# Patient Record
Sex: MALE | HISPANIC OR LATINO | Employment: FULL TIME | URBAN - METROPOLITAN AREA
[De-identification: names, ages, dates, MRNs, and addresses within clinical notes are randomized per-mention and may not be internally consistent; named-entity substitution may affect disease eponyms.]

---

## 2019-10-29 ENCOUNTER — APPOINTMENT (OUTPATIENT)
Dept: RADIOLOGY | Facility: MEDICAL CENTER | Age: 39
End: 2019-10-29
Attending: EMERGENCY MEDICINE

## 2019-10-29 ENCOUNTER — HOSPITAL ENCOUNTER (EMERGENCY)
Facility: MEDICAL CENTER | Age: 39
End: 2019-10-30
Attending: EMERGENCY MEDICINE

## 2019-10-29 DIAGNOSIS — R07.9 CHEST PAIN, UNSPECIFIED TYPE: ICD-10-CM

## 2019-10-29 PROCEDURE — 93005 ELECTROCARDIOGRAM TRACING: CPT | Performed by: EMERGENCY MEDICINE

## 2019-10-29 PROCEDURE — A9270 NON-COVERED ITEM OR SERVICE: HCPCS | Performed by: EMERGENCY MEDICINE

## 2019-10-29 PROCEDURE — 85027 COMPLETE CBC AUTOMATED: CPT

## 2019-10-29 PROCEDURE — 700102 HCHG RX REV CODE 250 W/ 637 OVERRIDE(OP): Performed by: EMERGENCY MEDICINE

## 2019-10-29 PROCEDURE — 80053 COMPREHEN METABOLIC PANEL: CPT

## 2019-10-29 PROCEDURE — 85007 BL SMEAR W/DIFF WBC COUNT: CPT

## 2019-10-29 PROCEDURE — 71045 X-RAY EXAM CHEST 1 VIEW: CPT

## 2019-10-29 PROCEDURE — 84484 ASSAY OF TROPONIN QUANT: CPT

## 2019-10-29 PROCEDURE — 93005 ELECTROCARDIOGRAM TRACING: CPT

## 2019-10-29 RX ORDER — ASPIRIN 81 MG/1
324 TABLET, CHEWABLE ORAL ONCE
Status: COMPLETED | OUTPATIENT
Start: 2019-10-30 | End: 2019-10-29

## 2019-10-29 RX ADMIN — ASPIRIN 81 MG 324 MG: 81 TABLET ORAL at 23:45

## 2019-10-30 VITALS
WEIGHT: 194.45 LBS | OXYGEN SATURATION: 99 % | HEART RATE: 88 BPM | RESPIRATION RATE: 18 BRPM | HEIGHT: 66 IN | TEMPERATURE: 98.6 F | SYSTOLIC BLOOD PRESSURE: 128 MMHG | BODY MASS INDEX: 31.25 KG/M2 | DIASTOLIC BLOOD PRESSURE: 72 MMHG

## 2019-10-30 LAB
ALBUMIN SERPL BCP-MCNC: 4.8 G/DL (ref 3.2–4.9)
ALBUMIN/GLOB SERPL: 2.2 G/DL
ALP SERPL-CCNC: 69 U/L (ref 30–99)
ALT SERPL-CCNC: 46 U/L (ref 2–50)
ANION GAP SERPL CALC-SCNC: 9 MMOL/L (ref 0–11.9)
AST SERPL-CCNC: 24 U/L (ref 12–45)
BASOPHILS # BLD AUTO: 0.9 % (ref 0–1.8)
BASOPHILS # BLD: 0.06 K/UL (ref 0–0.12)
BILIRUB SERPL-MCNC: 0.5 MG/DL (ref 0.1–1.5)
BUN SERPL-MCNC: 14 MG/DL (ref 8–22)
CALCIUM SERPL-MCNC: 9.4 MG/DL (ref 8.5–10.5)
CHLORIDE SERPL-SCNC: 106 MMOL/L (ref 96–112)
CO2 SERPL-SCNC: 26 MMOL/L (ref 20–33)
CREAT SERPL-MCNC: 1.11 MG/DL (ref 0.5–1.4)
EKG IMPRESSION: NORMAL
EKG IMPRESSION: NORMAL
EOSINOPHIL # BLD AUTO: 0.58 K/UL (ref 0–0.51)
EOSINOPHIL NFR BLD: 8.6 % (ref 0–6.9)
ERYTHROCYTE [DISTWIDTH] IN BLOOD BY AUTOMATED COUNT: 40.5 FL (ref 35.9–50)
GLOBULIN SER CALC-MCNC: 2.2 G/DL (ref 1.9–3.5)
GLUCOSE SERPL-MCNC: 167 MG/DL (ref 65–99)
HCT VFR BLD AUTO: 44.7 % (ref 42–52)
HGB BLD-MCNC: 15.2 G/DL (ref 14–18)
LYMPHOCYTES # BLD AUTO: 2.28 K/UL (ref 1–4.8)
LYMPHOCYTES NFR BLD: 33.6 % (ref 22–41)
MANUAL DIFF BLD: NORMAL
MCH RBC QN AUTO: 30.5 PG (ref 27–33)
MCHC RBC AUTO-ENTMCNC: 34 G/DL (ref 33.7–35.3)
MCV RBC AUTO: 89.8 FL (ref 81.4–97.8)
MONOCYTES # BLD AUTO: 0.47 K/UL (ref 0–0.85)
MONOCYTES NFR BLD AUTO: 6.9 % (ref 0–13.4)
MORPHOLOGY BLD-IMP: NORMAL
NEUTROPHILS # BLD AUTO: 3.4 K/UL (ref 1.82–7.42)
NEUTROPHILS NFR BLD: 50 % (ref 44–72)
NRBC # BLD AUTO: 0 K/UL
NRBC BLD-RTO: 0 /100 WBC
PLATELET # BLD AUTO: 279 K/UL (ref 164–446)
PLATELET BLD QL SMEAR: NORMAL
PMV BLD AUTO: 10.1 FL (ref 9–12.9)
POTASSIUM SERPL-SCNC: 3.6 MMOL/L (ref 3.6–5.5)
PROT SERPL-MCNC: 7 G/DL (ref 6–8.2)
RBC # BLD AUTO: 4.98 M/UL (ref 4.7–6.1)
RBC BLD AUTO: PRESENT
SODIUM SERPL-SCNC: 141 MMOL/L (ref 135–145)
TROPONIN T SERPL-MCNC: <6 NG/L (ref 6–19)
TROPONIN T SERPL-MCNC: <6 NG/L (ref 6–19)
VARIANT LYMPHS BLD QL SMEAR: NORMAL
WBC # BLD AUTO: 6.8 K/UL (ref 4.8–10.8)

## 2019-10-30 PROCEDURE — 99285 EMERGENCY DEPT VISIT HI MDM: CPT

## 2019-10-30 PROCEDURE — 93005 ELECTROCARDIOGRAM TRACING: CPT | Performed by: EMERGENCY MEDICINE

## 2019-10-30 PROCEDURE — 84484 ASSAY OF TROPONIN QUANT: CPT

## 2019-10-30 NOTE — ED TRIAGE NOTES
"Chief Complaint   Patient presents with   • Chest Pain     L sided. got worse tonight and his entire L side hurts    • Difficulty Breathing     Pt ambulatory to triage for above complaint. Pt states he has a lot of pressure on his chest. Pt states his entire L side hurts and he feels very warm. Pt appears uncomfortable. Pt denies major medical hx. Pt denies vomiting or nausea.   Pt to EKG.   BP (!) 176/106   Pulse (!) 106   Temp 37 °C (98.6 °F) (Temporal)   Resp 18   Ht 1.676 m (5' 6\")   Wt 88.2 kg (194 lb 7.1 oz)   SpO2 97%   BMI 31.38 kg/m²     "

## 2019-10-30 NOTE — ED NOTES
Pt presents to the ED with complaints of chest pain. Pt states that after she got home from work he got into the shower had started to feel lightheaded and dizzy with chest pain that started mid-chest and radiates t his back. Pt states that he did not pass out or have episode of emesis. Pt states that he still feels the chest pain now with some numbness and tingling in his lower extremities. Pt reports chills but denies fever. Pt denies any medical hx or daily medications. Pt states he has not yet received his flu shot.

## 2019-10-30 NOTE — ED PROVIDER NOTES
"ED Provider Note    Scribed for Shar Trejo M.D. by Windy Lundy. 10/29/2019, 11:32 PM.    Primary care provider: Pcp Pt States None  Means of arrival: Walk in  History obtained from: Patient through    History limited by: None    CHIEF COMPLAINT  Chief Complaint   Patient presents with   • Chest Pain     L sided. got worse tonight and his entire L side hurts    • Difficulty Breathing       HPI  Rex Hidalgo is a 39 y.o. 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 He states the pain resolved shortly after, but was prompted to come to the ED tonight as the pain returned while he was at rest in bed. The patient endorses associated dizziness and tingling to his bilateral lower extremities, but no fever. He has no attempted to take any medicaitons for alleviation. He reports no similar symptoms in the past. The patient has no long term medical conditions and does not take any daily medications.     REVIEW OF SYSTEMS  See HPI for further details. All other systems are negative.     PAST MEDICAL HISTORY   No diabetes.    SURGICAL HISTORY  patient denies any surgical history    SOCIAL HISTORY  Social History     Tobacco Use   • Smoking status: Never Smoker   Substance Use Topics   • Alcohol use: Yes   • Drug use: No      Social History     Substance and Sexual Activity   Drug Use No       FAMILY HISTORY  History reviewed. No pertinent family history.    CURRENT MEDICATIONS  Reviewed.  See Encounter Summary.     ALLERGIES  No Known Allergies    PHYSICAL EXAM  VITAL SIGNS: /88   Pulse 92   Temp 37 °C (98.6 °F) (Temporal)   Resp 18   Ht 1.676 m (5' 6\")   Wt 88.2 kg (194 lb 7.1 oz)   SpO2 95%   BMI 31.38 kg/m²   Constitutional: Alert in no apparent distress.  HENT: No signs of trauma, Bilateral external ears " normal, Nose normal.   Eyes: Pupils are equal and reactive, Conjunctiva normal, Non-icteric.   Neck: Normal range of motion, No tenderness, Supple, No stridor.   Cardiovascular: Regular rate and rhythm, no murmurs.   Thorax & Lungs: Normal breath sounds, No respiratory distress, No wheezing, No chest tenderness.   Abdomen: Bowel sounds normal, Soft, No tenderness, No masses, No pulsatile masses. No peritoneal signs.  Skin: Warm, Dry, No erythema, No rash.   Extremities: Intact distal pulses, No edema, No tenderness, No cyanosis  Musculoskeletal: Good range of motion in all major joints. No tenderness to palpation or major deformities noted.   Neurologic: Alert , Normal motor function, Normal sensory function, No focal deficits noted.   Psychiatric: Affect normal, Judgment normal, Slightly anxious.     DIAGNOSTIC STUDIES / PROCEDURES     LABS  Results for orders placed or performed during the hospital encounter of 10/29/19   CBC with Differential   Result Value Ref Range    WBC 6.8 4.8 - 10.8 K/uL    RBC 4.98 4.70 - 6.10 M/uL    Hemoglobin 15.2 14.0 - 18.0 g/dL    Hematocrit 44.7 42.0 - 52.0 %    MCV 89.8 81.4 - 97.8 fL    MCH 30.5 27.0 - 33.0 pg    MCHC 34.0 33.7 - 35.3 g/dL    RDW 40.5 35.9 - 50.0 fL    Platelet Count 279 164 - 446 K/uL    MPV 10.1 9.0 - 12.9 fL    Neutrophils-Polys 50.00 44.00 - 72.00 %    Lymphocytes 33.60 22.00 - 41.00 %    Monocytes 6.90 0.00 - 13.40 %    Eosinophils 8.60 (H) 0.00 - 6.90 %    Basophils 0.90 0.00 - 1.80 %    Nucleated RBC 0.00 /100 WBC    Neutrophils (Absolute) 3.40 1.82 - 7.42 K/uL    Lymphs (Absolute) 2.28 1.00 - 4.80 K/uL    Monos (Absolute) 0.47 0.00 - 0.85 K/uL    Eos (Absolute) 0.58 (H) 0.00 - 0.51 K/uL    Baso (Absolute) 0.06 0.00 - 0.12 K/uL    NRBC (Absolute) 0.00 K/uL   Complete Metabolic Panel (CMP)   Result Value Ref Range    Sodium 141 135 - 145 mmol/L    Potassium 3.6 3.6 - 5.5 mmol/L    Chloride 106 96 - 112 mmol/L    Co2 26 20 - 33 mmol/L    Anion Gap 9.0 0.0 -  11.9    Glucose 167 (H) 65 - 99 mg/dL    Bun 14 8 - 22 mg/dL    Creatinine 1.11 0.50 - 1.40 mg/dL    Calcium 9.4 8.5 - 10.5 mg/dL    AST(SGOT) 24 12 - 45 U/L    ALT(SGPT) 46 2 - 50 U/L    Alkaline Phosphatase 69 30 - 99 U/L    Total Bilirubin 0.5 0.1 - 1.5 mg/dL    Albumin 4.8 3.2 - 4.9 g/dL    Total Protein 7.0 6.0 - 8.2 g/dL    Globulin 2.2 1.9 - 3.5 g/dL    A-G Ratio 2.2 g/dL   Troponin   Result Value Ref Range    Troponin T <6 6 - 19 ng/L   ESTIMATED GFR   Result Value Ref Range    GFR If African American >60 >60 mL/min/1.73 m 2    GFR If Non African American >60 >60 mL/min/1.73 m 2   DIFFERENTIAL MANUAL   Result Value Ref Range    Manual Diff Status PERFORMED    PERIPHERAL SMEAR REVIEW   Result Value Ref Range    Peripheral Smear Review see below    PLATELET ESTIMATE   Result Value Ref Range    Plt Estimation Normal    MORPHOLOGY   Result Value Ref Range    RBC Morphology Present     Reactive Lymphocytes Few    TROPONIN   Result Value Ref Range    Troponin T <6 6 - 19 ng/L   EKG   Result Value Ref Range    Report       [facility] Emergency Dept.    Test Date:  2019-10-29  Pt Name:    ISIDRO BROWN                 Department: ER  MRN:        [id number]                      Room:  Gender:     Male                         Technician: [id number]  :        1980                   Requested By:ER TRIAGE PROTOCOL  Order #:    [id number]                    Reading MD: Shar Trejo    Measurements  Intervals                                Axis  Rate:       96                           P:          70  WA:         144                          QRS:        36  QRSD:       92                           T:          3  QT:         348  QTc:        440    Interpretive Statements  SINUS RHYTHM  No previous ECG available for comparison  Electronically Signed On 10- 2:28:59 PDT by Shar Trejo     EKG   Result Value Ref Range    Report       [facility] Emergency Dept.    Test Date:   2019-10-30  Pt Name:    ISIDRO BROWN                 Department: ER  MRN:        [id number]                      Room:       RD 10  Gender:     Male                         Technician: [id number]  :        1980                   Requested By:SANTOSH BLOOD  Order #:    [id number]                    Reading MD:    Measurements  Intervals                                Axis  Rate:       76                           P:          47  WY:         152                          QRS:        15  QRSD:       94                           T:          -1  QT:         376  QTc:        423    Interpretive Statements  SINUS RHYTHM  Compared to ECG 10/29/2019 23:02:48  No significant changes       All labs were reviewed by me.    EKG  12 Lead EKG interpreted by me to show:  Sinus rhythm  Rate 96  Axis: Normal  Isolated T wave inversion in lead III  Isolated J point elevation in V2, likely benign early repol  No old EKG available for comparison.     Repeat EKG (02:35)   12 Lead EKG interpreted by me to show:  Sinus rhythm  Rate 76  Axis: Normal  Isolated T wave inversion in lead III  Isolated J point elevation in V2, likely benign early repol  Unchanged from EKG done earlier today.     RADIOLOGY  DX-CHEST-PORTABLE (1 VIEW)   Final Result      No radiographic evidence of acute cardiopulmonary process.        The radiologist's interpretation of all radiological studies and images have been reviewed by me.    COURSE & MEDICAL DECISION MAKING  Pertinent Labs & Imaging studies reviewed. (See chart for details)    11:32 PM - Patient seen and examined at bedside. Patient will be treated with Aspirin 324 mg. Ordered DX-Chest (1 view), CBC with differential, CMP, Troponin, and EKG to evaluate his symptoms.     2:30 AM - Patient was reevaluated at bedside. He is resting in bed. Discussed lab and radiology results with the patient which are reassuring, indicating normal troponin levels. He was informed he will be discharged home and will need to  follow up with Dr. Martinez (Cardiology).  The patient is understanding and agreeable to discharge.     Decision Making:  This is a 39 y.o. year old male who presents with above complaint.  Low risk from a heart perspective.  Delta troponin is negative.  No acute abnormalities noted on today's evaluation otherwise suggest the etiology of the patient's current complaint.  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  At this point he is feeling better.  He will be discharged to home with outpatient follow-up with cardiology for reevaluation.    The patient will return for new or worsening symptoms and is stable at the time of discharge.    DISPOSITION:  Patient will be discharged home in stable condition.    FOLLOW UP:  Emily Martinez M.D.  [address]  [address]  [facility] [id number]  [phone]          FINAL IMPRESSION  1. Chest pain, unspecified type          I, Windy Lundy (Scotty), am scribing for, and in the presence of, Shar Trejo M.D..    Electronically signed by: Windy Lundy (Scotty), 10/29/2019    IShar M.D. personally performed the services described in this documentation, as scribed by Windy Lundy in my presence, and it is both accurate and complete.    C.    The note accurately reflects work and decisions made by me.  Shar Trejo  10/30/2019  3:45 AM

## 2019-10-30 NOTE — ED NOTES
Pt discharged; Pt verbalized understanding of discharge education as well as information for follow-up. Pt ambulated to the lobby with steady gait accompanied by family for transportation home.

## 2022-02-24 ENCOUNTER — HOSPITAL ENCOUNTER (EMERGENCY)
Facility: MEDICAL CENTER | Age: 42
End: 2022-02-24
Attending: EMERGENCY MEDICINE

## 2022-02-24 ENCOUNTER — APPOINTMENT (OUTPATIENT)
Dept: RADIOLOGY | Facility: MEDICAL CENTER | Age: 42
End: 2022-02-24
Attending: EMERGENCY MEDICINE

## 2022-02-24 VITALS
HEART RATE: 85 BPM | BODY MASS INDEX: 32.6 KG/M2 | HEIGHT: 66 IN | TEMPERATURE: 98.3 F | SYSTOLIC BLOOD PRESSURE: 147 MMHG | WEIGHT: 202.82 LBS | DIASTOLIC BLOOD PRESSURE: 84 MMHG | OXYGEN SATURATION: 99 % | RESPIRATION RATE: 18 BRPM

## 2022-02-24 DIAGNOSIS — R42 DIZZINESS: ICD-10-CM

## 2022-02-24 DIAGNOSIS — R07.9 CHEST PAIN, UNSPECIFIED TYPE: ICD-10-CM

## 2022-02-24 LAB
ALBUMIN SERPL BCP-MCNC: 4.8 G/DL (ref 3.2–4.9)
ALBUMIN/GLOB SERPL: 2 G/DL
ALP SERPL-CCNC: 89 U/L (ref 30–99)
ALT SERPL-CCNC: 43 U/L (ref 2–50)
ANION GAP SERPL CALC-SCNC: 14 MMOL/L (ref 7–16)
AST SERPL-CCNC: 26 U/L (ref 12–45)
BASOPHILS # BLD AUTO: 1.5 % (ref 0–1.8)
BASOPHILS # BLD: 0.12 K/UL (ref 0–0.12)
BILIRUB SERPL-MCNC: 0.6 MG/DL (ref 0.1–1.5)
BUN SERPL-MCNC: 14 MG/DL (ref 8–22)
CALCIUM SERPL-MCNC: 9.5 MG/DL (ref 8.4–10.2)
CHLORIDE SERPL-SCNC: 102 MMOL/L (ref 96–112)
CO2 SERPL-SCNC: 22 MMOL/L (ref 20–33)
CREAT SERPL-MCNC: 1.2 MG/DL (ref 0.5–1.4)
EKG IMPRESSION: NORMAL
EOSINOPHIL # BLD AUTO: 0.49 K/UL (ref 0–0.51)
EOSINOPHIL NFR BLD: 6.1 % (ref 0–6.9)
ERYTHROCYTE [DISTWIDTH] IN BLOOD BY AUTOMATED COUNT: 40.2 FL (ref 35.9–50)
GLOBULIN SER CALC-MCNC: 2.4 G/DL (ref 1.9–3.5)
GLUCOSE SERPL-MCNC: 135 MG/DL (ref 65–99)
HCT VFR BLD AUTO: 48.6 % (ref 42–52)
HGB BLD-MCNC: 16.9 G/DL (ref 14–18)
IMM GRANULOCYTES # BLD AUTO: 0.04 K/UL (ref 0–0.11)
IMM GRANULOCYTES NFR BLD AUTO: 0.5 % (ref 0–0.9)
LYMPHOCYTES # BLD AUTO: 2.76 K/UL (ref 1–4.8)
LYMPHOCYTES NFR BLD: 34.2 % (ref 22–41)
MCH RBC QN AUTO: 30.4 PG (ref 27–33)
MCHC RBC AUTO-ENTMCNC: 34.8 G/DL (ref 33.7–35.3)
MCV RBC AUTO: 87.4 FL (ref 81.4–97.8)
MONOCYTES # BLD AUTO: 0.76 K/UL (ref 0–0.85)
MONOCYTES NFR BLD AUTO: 9.4 % (ref 0–13.4)
NEUTROPHILS # BLD AUTO: 3.91 K/UL (ref 1.82–7.42)
NEUTROPHILS NFR BLD: 48.3 % (ref 44–72)
NRBC # BLD AUTO: 0 K/UL
NRBC BLD-RTO: 0 /100 WBC
PLATELET # BLD AUTO: 263 K/UL (ref 164–446)
PMV BLD AUTO: 9.8 FL (ref 9–12.9)
POTASSIUM SERPL-SCNC: 3.5 MMOL/L (ref 3.6–5.5)
PROT SERPL-MCNC: 7.2 G/DL (ref 6–8.2)
RBC # BLD AUTO: 5.56 M/UL (ref 4.7–6.1)
SODIUM SERPL-SCNC: 138 MMOL/L (ref 135–145)
TROPONIN T SERPL-MCNC: <6 NG/L (ref 6–19)
WBC # BLD AUTO: 8.1 K/UL (ref 4.8–10.8)

## 2022-02-24 PROCEDURE — 93005 ELECTROCARDIOGRAM TRACING: CPT | Performed by: EMERGENCY MEDICINE

## 2022-02-24 PROCEDURE — 80053 COMPREHEN METABOLIC PANEL: CPT

## 2022-02-24 PROCEDURE — 71045 X-RAY EXAM CHEST 1 VIEW: CPT

## 2022-02-24 PROCEDURE — 85025 COMPLETE CBC W/AUTO DIFF WBC: CPT

## 2022-02-24 PROCEDURE — 93005 ELECTROCARDIOGRAM TRACING: CPT

## 2022-02-24 PROCEDURE — 84484 ASSAY OF TROPONIN QUANT: CPT

## 2022-02-24 PROCEDURE — 99283 EMERGENCY DEPT VISIT LOW MDM: CPT

## 2022-02-24 PROCEDURE — 99284 EMERGENCY DEPT VISIT MOD MDM: CPT

## 2022-02-24 ASSESSMENT — ENCOUNTER SYMPTOMS
FEVER: 0
SHORTNESS OF BREATH: 0
VOMITING: 0
PALPITATIONS: 1
NAUSEA: 0
DIZZINESS: 1
CHILLS: 0

## 2022-02-24 ASSESSMENT — PAIN DESCRIPTION - PAIN TYPE: TYPE: ACUTE PAIN

## 2022-02-25 NOTE — ED TRIAGE NOTES
"Chief Complaint   Patient presents with   • Chest Pain     Intermittent Monday, constant since Tuesday. Left sided chest pain, \"like a burn\" pain. No SoB.     ED Triage Vitals [02/24/22 2024]   Enc Vitals Group      Blood Pressure (!) 209/99      Pulse 86      Respiration 16      Temperature 36.8 °C (98.3 °F)      Temp src Temporal      Pulse Oximetry 98 %      Weight 92 kg (202 lb 13.2 oz)      Height 1.676 m (5' 6\")     Left arm BP also checked, 192/93.    "

## 2022-02-25 NOTE — ED NOTES
Pt discharged in stable condition.  Pt instructed to follow up with cardiology, follow up with PCP, return to the ER if symptoms worsen.

## 2022-08-02 ENCOUNTER — HOSPITAL ENCOUNTER (EMERGENCY)
Facility: MEDICAL CENTER | Age: 42
End: 2022-08-02

## 2022-08-02 ENCOUNTER — HOSPITAL ENCOUNTER (EMERGENCY)
Facility: MEDICAL CENTER | Age: 42
End: 2022-08-03
Attending: STUDENT IN AN ORGANIZED HEALTH CARE EDUCATION/TRAINING PROGRAM
Payer: COMMERCIAL

## 2022-08-02 VITALS
SYSTOLIC BLOOD PRESSURE: 189 MMHG | TEMPERATURE: 99.1 F | DIASTOLIC BLOOD PRESSURE: 107 MMHG | HEART RATE: 95 BPM | OXYGEN SATURATION: 95 % | BODY MASS INDEX: 32.38 KG/M2 | RESPIRATION RATE: 16 BRPM | WEIGHT: 201.5 LBS | HEIGHT: 66 IN

## 2022-08-02 DIAGNOSIS — R20.0 LUE NUMBNESS: ICD-10-CM

## 2022-08-02 DIAGNOSIS — S20.212A CONTUSION OF RIB ON LEFT SIDE, INITIAL ENCOUNTER: ICD-10-CM

## 2022-08-02 PROCEDURE — 96372 THER/PROPH/DIAG INJ SC/IM: CPT

## 2022-08-02 PROCEDURE — 99284 EMERGENCY DEPT VISIT MOD MDM: CPT

## 2022-08-02 PROCEDURE — A9270 NON-COVERED ITEM OR SERVICE: HCPCS | Performed by: STUDENT IN AN ORGANIZED HEALTH CARE EDUCATION/TRAINING PROGRAM

## 2022-08-02 PROCEDURE — 700111 HCHG RX REV CODE 636 W/ 250 OVERRIDE (IP): Performed by: STUDENT IN AN ORGANIZED HEALTH CARE EDUCATION/TRAINING PROGRAM

## 2022-08-02 PROCEDURE — 700101 HCHG RX REV CODE 250: Performed by: STUDENT IN AN ORGANIZED HEALTH CARE EDUCATION/TRAINING PROGRAM

## 2022-08-02 PROCEDURE — 700102 HCHG RX REV CODE 250 W/ 637 OVERRIDE(OP): Performed by: STUDENT IN AN ORGANIZED HEALTH CARE EDUCATION/TRAINING PROGRAM

## 2022-08-02 RX ORDER — METHOCARBAMOL 500 MG/1
1000 TABLET, FILM COATED ORAL ONCE
Status: COMPLETED | OUTPATIENT
Start: 2022-08-02 | End: 2022-08-02

## 2022-08-02 RX ORDER — LIDOCAINE 50 MG/G
1 PATCH TOPICAL ONCE
Status: DISCONTINUED | OUTPATIENT
Start: 2022-08-02 | End: 2022-08-03 | Stop reason: HOSPADM

## 2022-08-02 RX ORDER — KETOROLAC TROMETHAMINE 30 MG/ML
15 INJECTION, SOLUTION INTRAMUSCULAR; INTRAVENOUS ONCE
Status: COMPLETED | OUTPATIENT
Start: 2022-08-02 | End: 2022-08-02

## 2022-08-02 RX ADMIN — METHOCARBAMOL 1000 MG: 500 TABLET ORAL at 23:36

## 2022-08-02 RX ADMIN — LIDOCAINE 1 PATCH: 50 PATCH TOPICAL at 23:36

## 2022-08-02 RX ADMIN — KETOROLAC TROMETHAMINE 15 MG: 30 INJECTION, SOLUTION INTRAMUSCULAR; INTRAVENOUS at 23:36

## 2022-08-02 ASSESSMENT — ENCOUNTER SYMPTOMS
FEVER: 0
CHILLS: 0
NAUSEA: 1
SHORTNESS OF BREATH: 1
NECK PAIN: 0
COUGH: 1
LOSS OF CONSCIOUSNESS: 0
HEADACHES: 0
FALLS: 0
SORE THROAT: 0
DOUBLE VISION: 0
VOMITING: 0
ABDOMINAL PAIN: 1
BLURRED VISION: 0

## 2022-08-02 ASSESSMENT — FIBROSIS 4 INDEX: FIB4 SCORE: 0.6

## 2022-08-02 NOTE — LETTER
"  FORM C-4:  EMPLOYEE’S CLAIM FOR COMPENSATION/ REPORT OF INITIAL TREATMENT  EMPLOYEE’S CLAIM - PROVIDE ALL INFORMATION REQUESTED   First Name Jerson Last Name Rocky Birthdate 1980  Sex male Claim Number   Home Address [address]   [facility] [place] [place]             Zip [address]                                   Age  42 y.o. Height  1.676 m (5' 6\") Weight  91.4 kg (201 lb 8 oz) [facility]  [id number]  [id number]   Mailing Address [address]  [place]              [place] [address] Telephone  [phone] (home)  Primary Language Spoken  Montserratian   Insurer   Third Party   creads Employee's Occupation (Job Title) When Injury or Occupational Disease Occurred  [employer]   Employer's Name [employer] Telephone [phone]    Employer Address [address] [facility] [address]] Zip [address]   Date of Injury  8/1/2022       Hour of Injury  3:00 PM Date Employer Notified  8/2/2022 Last Day of Work after Injury or Occupational Disease  8/2/2022 Supervisor to Whom Injury Reported  Tahir NEYMAR Hidalgo   Address or Location of Accident (if applicable) Work [1]   What were you doing at the time of accident? (if applicable) [employer]    How did this injury or occupational disease occur? Be specific and answer in detail. Use additional sheet if necessary)  en el proceso de formar la varilla se le dejo caer enlado izaquerdo de las costillos   If you believe that you have an occupational disease, when did you first have knowledge of the disability and it relationship to your employment? N/A Witnesses to the Accident  N/A   Nature of Injury or Occupational Disease  Workers' Compensation Part(s) of Body Injured or Affected  Internal Organs, N/A, N/A    I CERTIFY THAT THE ABOVE IS TRUE AND CORRECT TO THE BEST OF MY KNOWLEDGE AND THAT I HAVE PROVIDED THIS INFORMATION IN ORDER TO OBTAIN THE BENEFITS OF [place] AND " [place]  I HEREBY AUTHORIZE ANY PHYSICIAN, CHIROPRACTOR, SURGEON, PRACTITIONER, OR OTHER PERSON, ANY HOSPITAL, INCLUDING [facility] OR [facility], ANY MEDICAL SERVICE ORGANIZATION, ANY INSURANCE COMPANY, OR OTHER INSTITUTION OR ORGANIZATION TO RELEASE TO EACH OTHER, ANY MEDICAL OR OTHER INFORMATION, INCLUDING BENEFITS PAID OR PAYABLE, PERTINENT TO THIS INJURY OR DISEASE, EXCEPT INFORMATION RELATIVE TO DIAGNOSIS, TREATMENT AND/OR COUNSELING FOR AIDS, PSYCHOLOGICAL CONDITIONS, ALCOHOL OR CONTROLLED SUBSTANCES, FOR WHICH I MUST GIVE SPECIFIC AUTHORIZATION.  A PHOTOSTAT OF THIS AUTHORIZATION SHALL BE AS VALID AS THE ORIGINAL.  Date  8/3/2022      Place  [facility]       Employee’s Signature   THIS REPORT MUST BE COMPLETED AND MAILED WITHIN 3 WORKING DAYS OF TREATMENT   Place [facility], EMERGENCY DEPT                       Name of Facility [facility]   Date  8/2/2022 Diagnosis  (S20.212A) Contusion of rib on left side, initial encounter  (R20.0) LUE numbness Is there evidence the injured employee was under the influence of alcohol and/or another controlled substance at the time of accident?   Hour  4:06 AM Description of Injury or Disease  Contusion of rib on left side, initial encounter  LUE numbness No   Treatment     Have you advised the patient to remain off work five days or more?         No   X-Ray Findings  Negative If Yes   From Date    To Date      From information given by the employee, together with medical evidence, can you directly connect this injury or occupational disease as job incurred? Yes If No, is employee capable of: Full Duty  No Modified Duty  Yes   Is additional medical care by a physician indicated? Yes If Modified Duty, Specify any Limitations / Restrictions       Do you know of any previous injury or disease contributing to this condition or occupational  "disease? No    Date 8/3/2022 Print Doctor’s Name Musa, Radha ROMERO certify the employer’s copy of this form was mailed on:   Address [address] [id number] [phone] INSURER’S USE ONLY   Provider’s Tax ID Number [id number] Telephone Dept: [phone]    Doctor’s Signature e-RADHA Sinclair M.D. Degree     MD      Form C-4 (rev.10/07)                                                                         BRIEF DESCRIPTION OF RIGHTS AND BENEFITS  (Pursuant to [place] 616[place])    Notice of Injury or Occupational Disease (Incident Report Form C-1): If an injury or occupational disease (OD) arises out of and in the course of employment, you must provide written notice to your employer as soon as practicable, but no later than 7 days after the accident or OD. Your employer shall maintain a sufficient supply of the required forms.    Claim for Compensation (Form C-4): If medical treatment is sought, the form C-4 is available at the place of initial treatment. A completed \"Claim for Compensation\" (Form C-4) must be filed within 90 days after an accident or OD. The treating physician or chiropractor must, within 3 working days after treatment, complete and mail to the employer, the employer's insurer and third-party , the Claim for Compensation.    Medical Treatment: If you require medical treatment for your on-the-job injury or OD, you may be required to select a physician or chiropractor from a list provided by your workers’ compensation insurer, if it has contracted with an Organization for Managed Care (MCO) or Preferred Provider Organization (PPO) or providers of health care. If your employer has not entered into a contract with an MCO or PPO, you may select a physician or chiropractor from the Panel of Physicians and Chiropractors. Any medical costs related to your industrial injury or OD will be paid by your insurer.    Temporary Total Disability (TTD): If your doctor " has certified that you are unable to work for a period of at least 5 consecutive days, or 5 cumulative days in a 20-day period, or places restrictions on you that your employer does not accommodate, you may be entitled to TTD compensation.    Temporary Partial Disability (TPD): If the wage you receive upon reemployment is less than the compensation for TTD to which you are entitled, the insurer may be required to pay you TPD compensation to make up the difference. TPD can only be paid for a maximum of 24 months.    Permanent Partial Disability (PPD): When your medical condition is stable and there is an indication of a PPD as a result of your injury or OD, within 30 days, your insurer must arrange for an evaluation by a rating physician or chiropractor to determine the degree of your PPD. The amount of your PPD award depends on the date of injury, the results of the PPD evaluation, your age and wage.    Permanent Total Disability (PTD): If you are medically certified by a treating physician or chiropractor as permanently and totally disabled and have been granted a PTD status by your insurer, you are entitled to receive monthly benefits not to exceed 66 2/3% of your average monthly wage. The amount of your PTD payments is subject to reduction if you previously received a lump-sum PPD award.    Vocational Rehabilitation Services: You may be eligible for vocational rehabilitation services if you are unable to return to the job due to a permanent physical impairment or permanent restrictions as a result of your injury or occupational disease.    Transportation and Per Richa Reimbursement: You may be eligible for travel expenses and per richa associated with medical treatment.    Reopening: You may be able to reopen your claim if your condition worsens after claim closure.     Appeal Process: If you disagree with a written determination issued by the insurer or the insurer does not respond to your request, you may appeal  to the Department of Administration, , by following the instructions contained in your determination letter. You must appeal the determination within 70 days from the date of the determination letter at [address], [address], [place], [place] [address], or [address], [address], [place], [place] [address]. If you disagree with the  decision, you may appeal to the Department of Administration, . You must file your appeal within 30 days from the date of the  decision letter at [address], [address], [place], [place] [address], or [address], [address], [place], [place] [address]. If you disagree with a decision of an , you may file a petition for judicial review with the District Court. You must do so within 30 days of the Appeal Officer’s decision. You may be represented by an  at your own expense or you may contact the [facility] for possible representation.    [place]  for Injured Workers (NAIW): If you disagree with a  decision, you may request that NAIW represent you without charge at an  Hearing. For information regarding denial of benefits, you may contact the [facility] at: [address], [address], [place] [address], [phone], or [address], [address], [place] [address], [phone]    To File a Complaint with the Division: If you wish to file a complaint with the  of the Division of Industrial Relations (DIR),  please contact the Workers’ Compensation Section, [address], [address], [place], [place] [address], telephone ([phone], or [address], [address], [place], [place] [address], telephone [phone].    For assistance with Workers’ Compensation Issues: You may contact the [facility] Office for Consumer Health Assistance, [address] [place], [address], [place], [place] [address],  Toll Free 1-999.800.8905, Web site: http://[facility].[place].gov/Programs/JUAN JOSÉ E-mail: juan josé@[facility].[place].gov  D-2 (rev. 10/20)              __________________________________________________________________                                    _________________            Employee Name / Signature                                                                                                                            Date

## 2022-08-02 NOTE — LETTER
"  FORM C-4:  EMPLOYEE’S CLAIM FOR COMPENSATION/ REPORT OF INITIAL TREATMENT  EMPLOYEE’S CLAIM - PROVIDE ALL INFORMATION REQUESTED   First Name Jerson Last Name Rocky Birthdate 1980  Sex male Claim Number   Home Address [address]  [address]   [place]             [place] [address]                                   Age  42 y.o. Height  1.676 m (5' 6\") Weight  91.4 kg (201 lb 8 oz) [facility]  [id number]   Mailing Address [address]  [place]              [place] [address] Telephone  [phone] (home)  Primary Language Spoken   Insurer  *** Third Party   [employer] Employee's Occupation (Job Title) When Injury or Occupational Disease Occurred  [employer]   Employer's Name [employer] Telephone [phone]    Employer Address [address] [facility] [[address]] Zip [address]   Date of Injury  8/1/2022       Hour of Injury  3:00 PM Date Employer Notified  8/2/2022 Last Day of Work after Injury or Occupational Disease  8/2/2022 Supervisor to Whom Injury Reported  Tahir NEYMAR Hidalgo   Address or Location of Accident (if applicable) Work [1]   What were you doing at the time of accident? (if applicable) [employer]    How did this injury or occupational disease occur? Be specific and answer in detail. Use additional sheet if necessary)  en el proceso de formar la varilla se le dejo caer enlado izaquerdo de las costillos   If you believe that you have an occupational disease, when did you first have knowledge of the disability and it relationship to your employment? N/A Witnesses to the Accident  N/A   Nature of Injury or Occupational Disease  Workers' Compensation Part(s) of Body Injured or Affected  Internal Organs, N/A, N/A    I CERTIFY THAT THE ABOVE IS TRUE AND CORRECT TO THE BEST OF MY KNOWLEDGE AND THAT I HAVE PROVIDED THIS INFORMATION IN ORDER TO OBTAIN THE BENEFITS OF [place] AND " [place] CHAPTER 617 OF [place]).  I HEREBY AUTHORIZE ANY PHYSICIAN, CHIROPRACTOR, SURGEON, PRACTITIONER, OR OTHER PERSON, ANY HOSPITAL, INCLUDING [facility] OR [facility], ANY MEDICAL SERVICE ORGANIZATION, ANY INSURANCE COMPANY, OR OTHER INSTITUTION OR ORGANIZATION TO RELEASE TO EACH OTHER, ANY MEDICAL OR OTHER INFORMATION, INCLUDING BENEFITS PAID OR PAYABLE, PERTINENT TO THIS INJURY OR DISEASE, EXCEPT INFORMATION RELATIVE TO DIAGNOSIS, TREATMENT AND/OR COUNSELING FOR AIDS, PSYCHOLOGICAL CONDITIONS, ALCOHOL OR CONTROLLED SUBSTANCES, FOR WHICH I MUST GIVE SPECIFIC AUTHORIZATION.  A PHOTOSTAT OF THIS AUTHORIZATION SHALL BE AS VALID AS THE ORIGINAL.  Date                                      Place                                                                             Employee’s Signature   THIS REPORT MUST BE COMPLETED AND MAILED WITHIN 3 WORKING DAYS OF TREATMENT   Place [facility], EMERGENCY DEPT                       Name of Facility [facility]   Date  8/2/2022 Diagnosis  (S20.212A) Contusion of rib on left side, initial encounter  (R20.0) LUE numbness Is there evidence the injured employee was under the influence of alcohol and/or another controlled substance at the time of accident?   Hour  3:30 AM Description of Injury or Disease  Contusion of rib on left side, initial encounter  LUE numbness     Treatment     Have you advised the patient to remain off work five days or more?             X-Ray Findings    If Yes   From Date    To Date      From information given by the employee, together with medical evidence, can you directly connect this injury or occupational disease as job incurred?   If No, is employee capable of: Full Duty    Modified Duty      Is additional medical care by a physician indicated?   If Modified Duty, Specify any Limitations / Restrictions       Do you know of any  "previous injury or disease contributing to this condition or occupational disease?      Date 8/3/2022 Print Doctor’s Name Christiano Musa certify the employer’s copy of this form was mailed on:   Address [address] [id number] [phone] INSURER’S USE ONLY   Provider’s Tax ID Number [id number] Telephone Dept: [phone]    Doctor’s Signature   Degree        Form C-4 (rev.10/07)                                                                         BRIEF DESCRIPTION OF RIGHTS AND BENEFITS  (Pursuant to [place]RS 616C.050)    Notice of Injury or Occupational Disease (Incident Report Form C-1): If an injury or occupational disease (OD) arises out of and in the course of employment, you must provide written notice to your employer as soon as practicable, but no later than 7 days after the accident or OD. Your employer shall maintain a sufficient supply of the required forms.    Claim for Compensation (Form C-4): If medical treatment is sought, the form C-4 is available at the place of initial treatment. A completed \"Claim for Compensation\" (Form C-4) must be filed within 90 days after an accident or OD. The treating physician or chiropractor must, within 3 working days after treatment, complete and mail to the employer, the employer's insurer and third-party , the Claim for Compensation.    Medical Treatment: If you require medical treatment for your on-the-job injury or OD, you may be required to select a physician or chiropractor from a list provided by your workers’ compensation insurer, if it has contracted with an Organization for Managed Care (MCO) or Preferred Provider Organization (PPO) or providers of health care. If your employer has not entered into a contract with an MCO or PPO, you may select a physician or chiropractor from the Panel of Physicians and Chiropractors. Any medical costs related to your industrial injury or OD will be paid by your insurer.    Temporary " Total Disability (TTD): If your doctor has certified that you are unable to work for a period of at least 5 consecutive days, or 5 cumulative days in a 20-day period, or places restrictions on you that your employer does not accommodate, you may be entitled to TTD compensation.    Temporary Partial Disability (TPD): If the wage you receive upon reemployment is less than the compensation for TTD to which you are entitled, the insurer may be required to pay you TPD compensation to make up the difference. TPD can only be paid for a maximum of 24 months.    Permanent Partial Disability (PPD): When your medical condition is stable and there is an indication of a PPD as a result of your injury or OD, within 30 days, your insurer must arrange for an evaluation by a rating physician or chiropractor to determine the degree of your PPD. The amount of your PPD award depends on the date of injury, the results of the PPD evaluation, your age and wage.    Permanent Total Disability (PTD): If you are medically certified by a treating physician or chiropractor as permanently and totally disabled and have been granted a PTD status by your insurer, you are entitled to receive monthly benefits not to exceed 66 2/3% of your average monthly wage. The amount of your PTD payments is subject to reduction if you previously received a lump-sum PPD award.    Vocational Rehabilitation Services: You may be eligible for vocational rehabilitation services if you are unable to return to the job due to a permanent physical impairment or permanent restrictions as a result of your injury or occupational disease.    Transportation and Per Richa Reimbursement: You may be eligible for travel expenses and per richa associated with medical treatment.    Reopening: You may be able to reopen your claim if your condition worsens after claim closure.     Appeal Process: If you disagree with a written determination issued by the insurer or the insurer does not  respond to your request, you may appeal to the Department of Administration, , by following the instructions contained in your determination letter. You must appeal the determination within 70 days from the date of the determination letter at [address], [address], [place], [place] [address], or [address], [address], [place], [place] [address]. If you disagree with the  decision, you may appeal to the Department of Administration, . You must file your appeal within 30 days from the date of the  decision letter at [address], [address], [place], [place] [address], or [address], [address], [place], [place] [address]. If you disagree with a decision of an , you may file a petition for judicial review with the District Court. You must do so within 30 days of the Appeal Officer’s decision. You may be represented by an  at your own expense or you may contact the [facility] for possible representation.    [place]  for Injured Workers (NAIW): If you disagree with a  decision, you may request that NAIW represent you without charge at an  Hearing. For information regarding denial of benefits, you may contact the [facility] at: [address], [address], [place] [address], [phone], or [address], [place] [address], [phone]    To File a Complaint with the Division: If you wish to file a complaint with the  of the Division of Industrial Relations (DIR),  please contact the Workers’ Compensation Section, [address], [address], [place], [place] [address], telephone [phone], or [address], [place], [place] [address], telephone [phone].    For assistance with Workers’ Compensation Issues: You may contact the [facility] Office for Consumer Health Assistance, [address] [place] [address]  [address], [address], [place], [place] [address], Toll Free 1-880.136.1921, Web site: http://[facility].[place].gov/Programs/JUAN JOSÉ E-mail: juan [email or web address].gov  D-2 (rev. 10/20)              __________________________________________________________________                                    _________________            Employee Name / Signature                                                                                                                            Date

## 2022-08-02 NOTE — LETTER
"  FORM C-4:  EMPLOYEE’S CLAIM FOR COMPENSATION/ REPORT OF INITIAL TREATMENT  EMPLOYEE’S CLAIM - PROVIDE ALL INFORMATION REQUESTED   First Name Jerson Last Name Rocky Birthdate 1980  Sex male Claim Number   Home Address [address]  [address]   [place]             [place] [address]                                   Age  42 y.o. 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 (if applicable) [employer]    How did this injury or occupational disease occur? Be specific and answer in detail. Use additional sheet if necessary)  en el proceso de formar la varilla se le dejo caer enlado izaquerdo de las costillos   If you believe that you have an occupational disease, when did you first have knowledge of the disability and it relationship to your employment? N/A Witnesses to the Accident  N/A   Nature of Injury or Occupational Disease  Workers' Compensation Part(s) of Body Injured or Affected  Internal Organs, N/A, N/A    I CERTIFY THAT THE ABOVE IS TRUE AND CORRECT TO THE BEST OF MY KNOWLEDGE AND THAT I HAVE PROVIDED THIS INFORMATION IN ORDER TO OBTAIN THE BENEFITS OF [place] AND " [place], [place]IVE OR CHAPTER 617 OF [place]).  I HEREBY AUTHORIZE ANY PHYSICIAN, CHIROPRACTOR, SURGEON, PRACTITIONER, OR OTHER PERSON, ANY HOSPITAL, INCLUDING [facility] OR [facility], ANY MEDICAL SERVICE ORGANIZATION, ANY INSURANCE COMPANY, OR OTHER INSTITUTION OR ORGANIZATION TO RELEASE TO EACH OTHER, ANY MEDICAL OR OTHER INFORMATION, INCLUDING BENEFITS PAID OR PAYABLE, PERTINENT TO THIS INJURY OR DISEASE, EXCEPT INFORMATION RELATIVE TO DIAGNOSIS, TREATMENT AND/OR COUNSELING FOR AIDS, PSYCHOLOGICAL CONDITIONS, ALCOHOL OR CONTROLLED SUBSTANCES, FOR WHICH I MUST GIVE SPECIFIC AUTHORIZATION.  A PHOTOSTAT OF THIS AUTHORIZATION SHALL BE AS VALID AS THE ORIGINAL.  Date                                      Place                                                                             Employee’s Signature   THIS REPORT MUST BE COMPLETED AND MAILED WITHIN 3 WORKING DAYS OF TREATMENT   Place [facility], EMERGENCY DEPT                       Name of Facility [facility]   Date  8/2/2022 Diagnosis  No diagnosis found. Is there evidence the injured employee was under the influence of alcohol and/or another controlled substance at the time of accident?   Hour  1:14 AM Description of Injury or Disease       Treatment     Have you advised the patient to remain off work five days or more?             X-Ray Findings    If Yes   From Date    To Date      From information given by the employee, together with medical evidence, can you directly connect this injury or occupational disease as job incurred?   If No, is employee capable of: Full Duty    Modified Duty      Is additional medical care by a physician indicated?   If Modified Duty, Specify any Limitations / Restrictions       Do you know of any previous injury or disease contributing to this condition or occupational disease?      Date 8/3/2022 Print Doctor’s Name Lencho  "Christiano ROMERO certify the employer’s copy of this form was mailed on:   Address [address] [id number] [phone] INSURER’S USE ONLY   Provider’s Tax ID Number [id number] Telephone Dept: [phone]    Doctor’s Signature   Degree        Form C-4 (rev.10/07)                                                                         BRIEF DESCRIPTION OF RIGHTS AND BENEFITS  ([place]ursuant to [place] 616C.050)    Notice of Injury or Occupational Disease (Incident Report Form C-1): If an injury or occupational disease (OD) arises out of and in the course of employment, you must provide written notice to your employer as soon as practicable, but no later than 7 days after the accident or OD. Your employer shall maintain a sufficient supply of the required forms.    Claim for Compensation (Form C-4): If medical treatment is sought, the form C-4 is available at the place of initial treatment. A completed \"Claim for Compensation\" (Form C-4) must be filed within 90 days after an accident or OD. The treating physician or chiropractor must, within 3 working days after treatment, complete and mail to the employer, the employer's insurer and third-party , the Claim for Compensation.    Medical Treatment: If you require medical treatment for your on-the-job injury or OD, you may be required to select a physician or chiropractor from a list provided by your workers’ compensation insurer, if it has contracted with an Organization for Managed Care (MCO) or Preferred Provider Organization (PPO) or providers of health care. If your employer has not entered into a contract with an MCO or PPO, you may select a physician or chiropractor from the Panel of Physicians and Chiropractors. Any medical costs related to your industrial injury or OD will be paid by your insurer.    Temporary Total Disability (TTD): If your doctor has certified that you are unable to work for a period of at least 5 consecutive days, or 5 " cumulative days in a 20-day period, or places restrictions on you that your employer does not accommodate, you may be entitled to TTD compensation.    Temporary Partial Disability (TPD): If the wage you receive upon reemployment is less than the compensation for TTD to which you are entitled, the insurer may be required to pay you TPD compensation to make up the difference. TPD can only be paid for a maximum of 24 months.    Permanent Partial Disability (PPD): When your medical condition is stable and there is an indication of a PPD as a result of your injury or OD, within 30 days, your insurer must arrange for an evaluation by a rating physician or chiropractor to determine the degree of your PPD. The amount of your PPD award depends on the date of injury, the results of the PPD evaluation, your age and wage.    Permanent Total Disability (PTD): If you are medically certified by a treating physician or chiropractor as permanently and totally disabled and have been granted a PTD status by your insurer, you are entitled to receive monthly benefits not to exceed 66 2/3% of your average monthly wage. The amount of your PTD payments is subject to reduction if you previously received a lump-sum PPD award.    Vocational Rehabilitation Services: You may be eligible for vocational rehabilitation services if you are unable to return to the job due to a permanent physical impairment or permanent restrictions as a result of your injury or occupational disease.    Transportation and Per Richa Reimbursement: You may be eligible for travel expenses and per richa associated with medical treatment.    Reopening: You may be able to reopen your claim if your condition worsens after claim closure.     Appeal Process: If you disagree with a written determination issued by the insurer or the insurer does not respond to your request, you may appeal to the Department of Administration, , by following the instructions  contained in your determination letter. You must appeal the determination within 70 days from the date of the determination letter at [address], [address], [place], [place] [address], or [address], [place], [place] [address]. If you disagree with the  decision, you may appeal to the Department of Administration, . You must file your appeal within 30 days from the date of the  decision letter at [address], [address], [place], [place] [address], or [address], [address], [place], [place] [address]. If you disagree with a decision of an , you may file a petition for judicial review with the District Court. You must do so within 30 days of the Appeal Officer’s decision. You may be represented by an  at your own expense or you may contact the [facility] for possible representation.    [place]  for In[place] Workers ([place]IW): If you disagree with a  decision, you may request that NAIW represent you without charge at an  Hearing. For information regarding denial of benefits, you may contact the [facility] at: [address], [address], [place] [address], [phone], or [address], [address], [place] [address], [phone]    To File a Complaint with the Division: If you wish to file a complaint with the  of the Division of Industrial Relations (DIR),  please contact the Workers’ Compensation Section, [address], [address], [place], [place] [address], telephone ([phone]) [phone], or [address], [place], [place] [address], telephone [phone].    For assistance with Workers’ Compensation Issues: You may contact the [facility] Office for Consumer Health Assistance, [address], [place], [place] [address], Toll Free 1-265.664.3220, Web site: http://[facility].[place].gov/Programs/JUAN JOSÉ E-mail:  kristie@[employer]cha.[place].gov  D-2 (rev. 10/20)              __________________________________________________________________                                    _________________            Employee Name / Signature                                                                                                                            Date

## 2022-08-03 ENCOUNTER — APPOINTMENT (OUTPATIENT)
Dept: RADIOLOGY | Facility: MEDICAL CENTER | Age: 42
End: 2022-08-03
Attending: STUDENT IN AN ORGANIZED HEALTH CARE EDUCATION/TRAINING PROGRAM
Payer: COMMERCIAL

## 2022-08-03 PROCEDURE — 73030 X-RAY EXAM OF SHOULDER: CPT | Mod: LT

## 2022-08-03 PROCEDURE — 74019 RADEX ABDOMEN 2 VIEWS: CPT

## 2022-08-03 PROCEDURE — 71101 X-RAY EXAM UNILAT RIBS/CHEST: CPT | Mod: LT

## 2022-08-03 RX ORDER — NAPROXEN 375 MG/1
375 TABLET ORAL 2 TIMES DAILY WITH MEALS
Qty: 20 TABLET | Refills: 0 | Status: SHIPPED | OUTPATIENT
Start: 2022-08-03 | End: 2024-01-21

## 2022-08-03 RX ORDER — LIDOCAINE 50 MG/G
1 PATCH TOPICAL EVERY 24 HOURS
Qty: 10 PATCH | Refills: 0 | Status: SHIPPED | OUTPATIENT
Start: 2022-08-03 | End: 2024-01-21

## 2022-08-03 RX ORDER — METHOCARBAMOL 750 MG/1
1500 TABLET, FILM COATED ORAL 3 TIMES DAILY
Qty: 20 TABLET | Refills: 0 | Status: SHIPPED | OUTPATIENT
Start: 2022-08-03 | End: 2024-01-21

## 2022-08-03 NOTE — ED PROVIDER NOTES
ED Provider Note    Chief Complaint:   Left chest wall pain    HPI:  Rex Hidalgo is a very pleasant 42-year-old male no significant past medical history who presents after an accident at work.  Patient reports that he had a large piece of wood that frames concrete fall onto his left side.  Patient reports shortness of breath, cough, abdominal distention following this injury.  Patient denies head trauma, back or neck trauma.  Patient does endorse intermittent nausea.  Patient reports taking 2 aspirin yesterday with effect and patient has been able to sleep because of the aspirin.  Patient also endorses left upper extremity numbness compared to the right side.  Patient endorses some shoulder pain.  Patient reports the pain is dull, nonradiating, constant, worse with palpation.    Review of Systems:  Review of Systems   Constitutional: Negative for chills and fever.   HENT: Negative for congestion and sore throat.    Eyes: Negative for blurred vision and double vision.   Respiratory: Positive for cough and shortness of breath.    Cardiovascular: Positive for chest pain. Negative for leg swelling.   Gastrointestinal: Positive for abdominal pain and nausea. Negative for vomiting.   Genitourinary: Negative for dysuria and hematuria.   Musculoskeletal: Negative for falls and neck pain.   Skin: Negative for itching and rash.   Neurological: Negative for loss of consciousness and headaches.       Family History:  History reviewed. No pertinent family history.    Past Medical History:   has a past medical history of Patient denies medical problems.    Social History:  Social History     Tobacco Use   • Smoking status: Never Smoker   • Smokeless tobacco: Never Used   Vaping Use   • Vaping Use: Never used   Substance and Sexual Activity   • Alcohol use: Yes     Comment: Occassional   • Drug use: No   • Sexual activity: Not on file       Surgical History:   has a past surgical history that includes shoulder  "arthroscopy.    Allergies:  No Known Allergies    Physical Exam:  Vital Signs: BP (!) 189/107   Pulse 95   Temp 37.3 °C (99.1 °F) (Temporal)   Resp 16   Ht 1.676 m (5' 6\")   Wt 91.4 kg (201 lb 8 oz)   SpO2 95%   BMI 32.52 kg/m²   Physical Exam  Vitals and nursing note reviewed.   Constitutional:       Comments: Patient is lying in bed supine, pleasant, conversant, speaking in complete sentences   HENT:      Head: Normocephalic and atraumatic.   Eyes:      Extraocular Movements: Extraocular movements intact.      Conjunctiva/sclera: Conjunctivae normal.      Pupils: Pupils are equal, round, and reactive to light.   Cardiovascular:      Pulses: Normal pulses.      Comments: HR 95  Pulmonary:      Effort: Pulmonary effort is normal. No respiratory distress.   Abdominal:      Comments: Abdomen is soft, left upper quadrant tenderness to palpation, no rebound, guarding or masses   Musculoskeletal:         General: No swelling. Normal range of motion.      Cervical back: Normal range of motion. No rigidity.      Comments: Left-sided chest wall tenderness to palpation, left anterior chest wall tenderness palpation, left posterior chest wall tenderness to palpation without crepitus.  Abrasions are present.  Tenderness palpation over the left anterior shoulder.   Skin:     General: Skin is warm and dry.      Capillary Refill: Capillary refill takes less than 2 seconds.   Neurological:      Mental Status: He is alert.      Comments:  strength 5 out of 5 in the bilateral upper extremities.  Patient does have decreased sensation to light touch over the median/radial/ulnar nerve distribution of the left upper extremity.         Medical records reviewed for continuity of care.     Results for orders placed or performed during the hospital encounter of 02/24/22   CBC with Differential   Result Value Ref Range    WBC 8.1 4.8 - 10.8 K/uL    RBC 5.56 4.70 - 6.10 M/uL    Hemoglobin 16.9 14.0 - 18.0 g/dL    Hematocrit 48.6 " 42.0 - 52.0 %    MCV 87.4 81.4 - 97.8 fL    MCH 30.4 27.0 - 33.0 pg    MCHC 34.8 33.7 - 35.3 g/dL    RDW 40.2 35.9 - 50.0 fL    Platelet Count 263 164 - 446 K/uL    MPV 9.8 9.0 - 12.9 fL    Neutrophils-Polys 48.30 44.00 - 72.00 %    Lymphocytes 34.20 22.00 - 41.00 %    Monocytes 9.40 0.00 - 13.40 %    Eosinophils 6.10 0.00 - 6.90 %    Basophils 1.50 0.00 - 1.80 %    Immature Granulocytes 0.50 0.00 - 0.90 %    Nucleated RBC 0.00 /100 WBC    Neutrophils (Absolute) 3.91 1.82 - 7.42 K/uL    Lymphs (Absolute) 2.76 1.00 - 4.80 K/uL    Monos (Absolute) 0.76 0.00 - 0.85 K/uL    Eos (Absolute) 0.49 0.00 - 0.51 K/uL    Baso (Absolute) 0.12 0.00 - 0.12 K/uL    Immature Granulocytes (abs) 0.04 0.00 - 0.11 K/uL    NRBC (Absolute) 0.00 K/uL   Complete Metabolic Panel (CMP)   Result Value Ref Range    Sodium 138 135 - 145 mmol/L    Potassium 3.5 (L) 3.6 - 5.5 mmol/L    Chloride 102 96 - 112 mmol/L    Co2 22 20 - 33 mmol/L    Anion Gap 14.0 7.0 - 16.0    Glucose 135 (H) 65 - 99 mg/dL    Bun 14 8 - 22 mg/dL    Creatinine 1.20 0.50 - 1.40 mg/dL    Calcium 9.5 8.4 - 10.2 mg/dL    AST(SGOT) 26 12 - 45 U/L    ALT(SGPT) 43 2 - 50 U/L    Alkaline Phosphatase 89 30 - 99 U/L    Total Bilirubin 0.6 0.1 - 1.5 mg/dL    Albumin 4.8 3.2 - 4.9 g/dL    Total Protein 7.2 6.0 - 8.2 g/dL    Globulin 2.4 1.9 - 3.5 g/dL    A-G Ratio 2.0 g/dL   Troponin   Result Value Ref Range    Troponin T <6 6 - 19 ng/L   ESTIMATED GFR   Result Value Ref Range    GFR If African American >60 >60 mL/min/1.73 m 2    GFR If Non African American >60 >60 mL/min/1.73 m 2   EKG   Result Value Ref Range    Report       [facility] Emergency Dept.    Test Date:  2022  Pt Name:    ISIDRO BROWN                 Department: EDS  MRN:        [id number]                      [facility]  Gender:     Male                         Technician: LAQUITA  :        1980                   Requested By:ER TRIAGE PROTOCOL  Order #:    [id number]                     Reading MD: Darinel Nick II, MD    Measurements  Intervals                                Axis  Rate:       81                           P:          67  MI:         152                          QRS:        22  QRSD:       94                           T:          0  QT:         376  QTc:        437    Interpretive Statements  SINUS RHYTHM  Rate 81  Normal intervals  No ST elevation or depression.  Normal T waves.  Impression: Normal sinus rhythm EKG similar to previous EKG in 2019.  Compared to ECG 10/30/2019 02:37:51  Atrial abnormality now present  Electronically Signed On 2- 20:55:39 PST by Darinel rinaldi II, MD         Radiology:  [id number] (WITH 1-VIEW CXR) LEFT   Final Result      1.  No acute cardiopulmonary process is seen.   2.  No displaced rib fracture is identified.      [id number] VIEWS   Final Result      1.  No evidence of bowel obstruction.   2.  Moderate amount of colonic stool.   3.  No free intraperitoneal air is identified.      DX-SHOULDER 2+ LEFT   Final Result      No evidence of acute fracture or dislocation.           MDM:  I am concerned about an acute neurologic injury causing the patient to have numbness of the left upper extremity, this is potentially secondary to a brachial plexus injury.  Shoulder x-ray to evaluate for fracture versus dislocation versus other acute osseous injury.  Rib x-ray to evaluate for rib fracture.  Chest x-ray to evaluate for acute cardiopulmonary process such as pneumonia, pulmonary edema, pneumothorax.  Abdominal x-ray to evaluate for perforation.  Bedside ultrasound FAST exam demonstrates no evidence of intra-abdominal hemorrhage.  Pain control including Toradol, lidocaine, methocarbamol.  Disposition pending work-up.    Electronically signed by: Christiano Musa M.D., 8/2/2022, 11:20 PM    Abdominal x-ray demonstrates no evidence of bowel obstruction,  Intraperitoneal free air.  Rib x-ray demonstrates no evidence of  acute cardiopulmonary process or rib fracture.  Shoulder x-ray demonstrates no evidence of acute osseous injury.  I have spoken to Dr. Chen of orthopedic surgery regarding the patient's numbness of the left upper extremity and he recommends outpatient follow-up in 2 weeks.    Patient reports improvement following Robaxin, Toradol, lidocaine patch.  Patient be discharged with Robaxin, lidocaine patch and Naprosyn.    Repeat physical exam benign.  I doubt any serious emergency process at this time.  Patient and/or family, friends given strict return precautions and care instructions. They have demonstrated understanding of discharge instructions through teach back mechanism. Advised PCP follow-up in 1-2 days.  Patient/family/friend expresses understanding and agrees to plan.    This dictation has been created using voice recognition software. I am continuously working with the software to minimize the number of voice recognition errors and I have made every attempt to manually correct the errors within my dictation. However errors  related to this voice recognition software may still exist and should be interpreted within the appropriate context.     Electronically signed by: Christiano Musa M.D., 8/3/2022 3:22 AM      Disposition:  Home    Final Impression:  1. Contusion of rib on left side, initial encounter    2. LUE numbness        Electronically signed by: Christiano Musa M.D., 8/3/2022 3:22 AM

## 2022-10-15 ENCOUNTER — HOSPITAL ENCOUNTER (OUTPATIENT)
Facility: MEDICAL CENTER | Age: 42
End: 2022-10-15
Attending: PATHOLOGY
Payer: COMMERCIAL

## 2022-10-15 LAB
ERYTHROCYTE [DISTWIDTH] IN BLOOD BY AUTOMATED COUNT: 42.5 FL (ref 35.9–50)
HCT VFR BLD AUTO: 50 % (ref 42–52)
HGB BLD-MCNC: 16.9 G/DL (ref 14–18)
MCH RBC QN AUTO: 30.8 PG (ref 27–33)
MCHC RBC AUTO-ENTMCNC: 33.8 G/DL (ref 33.7–35.3)
MCV RBC AUTO: 91.2 FL (ref 81.4–97.8)
PLATELET # BLD AUTO: 266 K/UL (ref 164–446)
PMV BLD AUTO: 10.8 FL (ref 9–12.9)
RBC # BLD AUTO: 5.48 M/UL (ref 4.7–6.1)
WBC # BLD AUTO: 6.5 K/UL (ref 4.8–10.8)

## 2022-12-19 NOTE — ED PROVIDER NOTES
"ED Provider Note    Scribed for NEYMAR Reid II* by Rachel Portre. 2/24/2022  9:16 PM    Means of Arrival: walk-in  History obtained by: patient  Limitations: none    CHIEF COMPLAINT  Chief Complaint   Patient presents with   • Chest Pain     Intermittent Monday, constant since Tuesday. Left sided chest pain, \"like a burn\" pain. No SoB.       HPI  Rex Hidalgo is a 41 y.o. male who presents to the Emergency Department for evaluation of left sided chest pain onset tonight. Last Monday he has been moderately dizzy. 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 Tonight Rex was just sitting down relaxing when he suddenly developed heart palpitations and chest pain. He sometimes gets occasional left inguinal pain. He denies shortness of breath, nausea, vomiting, fever, or chills. No alleviating factors were reported. He does not have a history of diabetes, hypertension, strokes, or myocardial infarction. He denies ever taking medication for hypertension. He has a family history of diabetes.     He was evaluated in 2019 with similar concerns.  Work-up was unremarkable at that time.    REVIEW OF SYSTEMS  Review of Systems   Constitutional: Negative for chills and fever.   Respiratory: Negative for shortness of breath.    Cardiovascular: Positive for chest pain and palpitations.   Gastrointestinal: Negative for nausea and vomiting.   Neurological: Positive for dizziness.   All other systems reviewed and are negative.  See HPI for further details.    PAST MEDICAL HISTORY   has a past medical history of Patient denies medical problems.    FAMILY HISTORY  Diabetes    SOCIAL HISTORY  Social History     Tobacco Use   • Smoking status: Never Smoker   • Smokeless tobacco: Never Used   Substance and Sexual Activity   • Alcohol use: Yes   • Drug use: No   • Sexual activity: Not reported       SURGICAL HISTORY   has a past surgical history that includes shoulder arthroscopy.    CURRENT " "MEDICATIONS  Home Medications     Reviewed by Chad Salguero R.N. (Registered Nurse) on 02/24/22 at 2026  Med List Status: Complete   Medication Last Dose Status   cyclobenzaprine (FLEXERIL) 10 MG TABS Not Taking Active   naproxen (NAPROSYN) 500 MG TABS Not Taking Active                ALLERGIES  No Known Allergies    PHYSICAL EXAM  VITAL SIGNS: BP (!) 192/93   Pulse 86   Temp 36.8 °C (98.3 °F) (Temporal)   Resp 16   Ht 1.676 m (5' 6\")   Wt 92 kg (202 lb 13.2 oz)   SpO2 98%   BMI 32.74 kg/m²     Pulse ox interpretation: I interpret this pulse ox as normal.  Constitutional: Alert in no apparent distress. Pleasant middle age man sitting upright in bed  HENT: No signs of trauma, Bilateral external ears normal, Nose normal.   Eyes: Pupils are equal, Conjunctiva normal, Non-icteric.   Neck: Normal range of motion, No tenderness, Supple, No stridor.   Cardiovascular: Regular rate and rhythm, no murmurs. Symmetric distal pulses. No cyanosis of extremities. No peripheral edema of extremities.  Thorax & Lungs: Normal breath sounds, No respiratory distress, No wheezing, No chest tenderness.   Abdomen: Soft, No tenderness, No masses, No pulsatile masses. No peritoneal signs.  No palpable hernias.  Skin: Warm, Dry, No erythema, No rash.   Back: No midline bony tenderness, No CVA tenderness.   Musculoskeletal: Good range of motion in all major joints. No tenderness to palpation or major deformities noted.   Neurologic: Alert , Normal motor function, Normal sensory function, No focal deficits noted.   Psychiatric: Affect normal, Judgment normal, Mood normal.     DIAGNOSTIC STUDIES / PROCEDURES    EKG Interpretation:  Interpreted by me  Results for orders placed or performed during the hospital encounter of 02/24/22   EKG   Result Value Ref Range    Report       [facility] Emergency Dept.    Test Date:  2022-02-24  Pt Name:    ISIDRO BROWN                 Department: [facility]  MRN:        [id number]     "                  [facility]  Gender:     Male                         Technician: LAQUITA  :        1980                   Requested By:ER TRIAGE PROTOCOL  Order #:    [id number]                    Reading MD: Darinel Nick II, MD    Measurements  Intervals                                Axis  Rate:       81                           P:          67  SC:         152                          QRS:        22  QRSD:       94                           T:          0  QT:         376  QTc:        437    Interpretive Statements  SINUS RHYTHM  Rate 81  Normal intervals  No ST elevation or depression.  Normal T waves.  Impression: Normal sinus rhythm EKG similar to previous EKG in 2019.  Compared to ECG 10/30/2019 02:37:51  Atrial abnormality now present  Electronically Signed On 2022 20:55:39 PST by Darinel rinaldi II, MD       LABS  Pertinent Labs & Imaging studies reviewed. (See chart for details)  Labs Reviewed   COMP METABOLIC PANEL - Abnormal; Notable for the following components:       Result Value    Potassium 3.5 (*)     Glucose 135 (*)     All other components within normal limits   CBC WITH DIFFERENTIAL   TROPONIN   ESTIMATED GFR      RADIOLOGY  DX-CHEST-PORTABLE (1 VIEW)   Final Result      No acute cardiopulmonary process is seen.        Pertinent Labs & Imaging studies reviewed. (See chart for details)    COURSE & MEDICAL DECISION MAKING  Pertinent Labs & Imaging studies reviewed. (See chart for details)    9:16 PM This is a 41 y.o. male who presents with chest pain and dizziness and the differential diagnosis includes but is not limited to MI, PE, pneumothorax (very low suspicion, PERC negative, no risk factors), dissection (description of pain not consistent with this etiology, blood pressure has been decreasing), arrhythmia, pneumonia, pericarditis. Also considering less emergent causes such as musculoskeletal pain, radiculopathy, costochondritis, dehydration, vertigo. Ordered for an  EKG, DX-Chest, Troponin, CMP, and CBC with diff to evaluate.     EKG does not show any obvious signs of ischemia, pericarditis, arrhythmia.    11:15 PM Patients cardiac workup is negative.  Unclear cause of his chest pain.  But no evidence of serious emergent etiology of pain at this time.  Unlikely MI.  Troponin undetectable.  Heart score of 1 is low risk and less than 2% chance of major adverse cardiac event.  His labs CBC, CMP labs are within acceptable limits. Patient was reevaluated at bedside. His blood pressure has decreased to 144/84. He states that he is feeling better right now.He should make sure he stays well hydrated, especially while he works since he works in construction. I informed him that will provide him with a referral to cardiology since he does not have one at this time. Discussed plans for discharge.  I have placed an order with our scheduling coordinators to help ensure close follow-up with cardiology clinic.  Anticipate he will have his blood pressure rechecked at his next provider visit.  Patient verbalizes understanding and agreement to this plan of care.      Heart Score: 1    The patient will return for worsening symptoms and is stable at the time of discharge. The patient verbalizes understanding and will comply. Guidance provided on appropriate use of medications.    DISPOSITION:  Patient will be discharged home in stable condition.    FOLLOW UP:  [facility]  [address], [address]  [place] [address] [phone]  Schedule an appointment as soon as possible for a visit   Call tomorrow to make an appointment    FINAL IMPRESSION  1. Chest pain, unspecified type    2. Dizziness          Rachel ROMERO (Scotty), am scribing for, and in the presence of, MENDOZA Reid II.    Electronically signed by: Rachel Porter (Scotty), 2/24/2022    Darinel ROMERO II, M* personally performed the services described in this documentation, as scribed by Rachel  German in my presence, and it is both accurate and complete.    The note accurately reflects work and decisions made by me.  Darinel Nick II, M.D.  2/25/2022  12:51 AM      O-L Flap Text: The defect edges were debeveled with a #15 scalpel blade.  Given the location of the defect, shape of the defect and the proximity to free margins an O-L flap was deemed most appropriate.  Using a sterile surgical marker, an appropriate advancement flap was drawn incorporating the defect and placing the expected incisions within the relaxed skin tension lines where possible.    The area thus outlined was incised deep to adipose tissue with a #15 scalpel blade.  The skin margins were undermined to an appropriate distance in all directions utilizing iris scissors.

## 2022-12-21 ENCOUNTER — HOSPITAL ENCOUNTER (EMERGENCY)
Facility: MEDICAL CENTER | Age: 42
End: 2022-12-22
Payer: COMMERCIAL

## 2022-12-21 ENCOUNTER — HOSPITAL ENCOUNTER (EMERGENCY)
Facility: MEDICAL CENTER | Age: 42
End: 2022-12-21

## 2022-12-21 VITALS
BODY MASS INDEX: 30.82 KG/M2 | WEIGHT: 191.8 LBS | HEIGHT: 66 IN | RESPIRATION RATE: 18 BRPM | OXYGEN SATURATION: 97 % | TEMPERATURE: 97.7 F | HEART RATE: 93 BPM

## 2022-12-21 DIAGNOSIS — R10.12 LEFT UPPER QUADRANT ABDOMINAL PAIN: ICD-10-CM

## 2022-12-21 PROCEDURE — 36415 COLL VENOUS BLD VENIPUNCTURE: CPT

## 2022-12-21 PROCEDURE — 99284 EMERGENCY DEPT VISIT MOD MDM: CPT

## 2022-12-21 PROCEDURE — 302449 STATCHG TRIAGE ONLY (STATISTIC)

## 2022-12-21 ASSESSMENT — FIBROSIS 4 INDEX: FIB4 SCORE: 0.6

## 2022-12-21 NOTE — LETTER
[facility], EMERGENCY DEPT   [address] [place] [address]  Phone: Dept: [phone] - Fax:        [facility] Progress Report and Disability Certification  Date of Service: 12/21/2022   No Show:  No  Date / Time of Next Visit:     Claim Information   Patient Name: Jerson Hidalgo  Claim Number:     Employer: [employer]  Date of Injury:      Insurer / TPA:   ID / SSN: [id number]    Occupation:  Diagnosis: The encounter diagnosis was Left upper quadrant abdominal pain.    Medical Information   Related to Industrial Injury?   ***   Subjective Complaints:      Objective Findings:     Pre-Existing Condition(s):     Assessment:        Status:    Permanent Disability:     Plan:      Diagnostics:      Comments:       Disability Information   Status:      From:     Through:   Restrictions are:     Physical Restrictions   Sitting:    Standing:    Stooping:    Bending:      Squatting:    Walking:    Climbing:    Pushing:      Pulling:    Other:    Reaching Above Shoulder (L):   Reaching Above Shoulder (R):       Reaching Below Shoulder (L):    Reaching Below Shoulder (R):      Not to exceed Weight Limits   Carrying(hrs):   Weight Limit(lb):   Lifting(hrs):   Weight  Limit(lb):     Comments:      Repetitive Actions   Hands: i.e. Fine Manipulations from Grasping:     Feet: i.e. Operating Foot Controls:     Driving / Operate Machinery:     Physician Name:   Physician Signature:   e-Signature:  , Medical Director   Clinic Name / Location: [facility], EMERGENCY DEPT  [address] [place] [id number] [phone]     Clinic Phone Number: Dept: [phone]   Appointment Time:  Visit Start Time:    Check-In Time:  11:41 PM Visit Discharge Time:    Original-Treating Physician or Chiropractor    Page 2-Insurer/TPA    Page 3-Employer    Page 4-Employee

## 2022-12-21 NOTE — LETTER
[facility], EMERGENCY DEPT   [address] [place] [address]  Phone: Dept: [phone] - Fax:        [facility] Progress Report and Disability Certification  Date of Service: 12/21/2022   No Show:  No  Date / Time of Next Visit:     Claim Information   Patient Name: Jerson Hidalgo  Claim Number:     Employer: [employer]  Date of Injury: 8/1/2022     Insurer / TPA:  [employer] Insurance ID / SSN: [id number]   Occupation: Fromen Diagnosis: The encounter diagnosis was Left upper quadrant abdominal pain.    Medical Information   Related to Industrial Injury?   Comments:unknown    Subjective Complaints:  Upper quadrant pain   Objective Findings: Upper quadrant tenderness   Pre-Existing Condition(s):     Assessment:   Condition Same    Status:    Permanent Disability:  Comments:Unlikely    Plan:   Comments:Follow-up with primary care physician    Diagnostics: CTLaboratory    Comments:  No obvious findings by CT or laboratory evaluation to explain patient's subjective symptoms    Disability Information   Status: Released to Full Duty    From:     Through:   Restrictions are:     Physical Restrictions   Sitting:    Standing:    Stooping:    Bending:      Squatting:    Walking:    Climbing:    Pushing:      Pulling:    Other:    Reaching Above Shoulder (L):   Reaching Above Shoulder (R):       Reaching Below Shoulder (L):    Reaching Below Shoulder (R):      Not to exceed Weight Limits   Carrying(hrs):   Weight Limit(lb):   Lifting(hrs):   Weight  Limit(lb):     Comments:      Repetitive Actions   Hands: i.e. Fine Manipulations from Grasping:     Feet: i.e. Operating Foot Controls:     Driving / Operate Machinery:     Physician Name:   Physician Signature: JOSÉ MIGUEL Landry M.D. e-Signature:  , Medical Director   Clinic Name / Location: [facility], EMERGENCY  DEPT  [address]  [place] [address]  [phone]     Clinic Phone Number: Dept: [phone]   Appointment Time:  Visit Start Time:    Check-In Time:  11:41 PM Visit Discharge Time:    Original-Treating Physician or Chiropractor    Page 2-Insurer/TPA    Page 3-Employer    Page 4-Employee

## 2022-12-22 ENCOUNTER — APPOINTMENT (OUTPATIENT)
Dept: RADIOLOGY | Facility: MEDICAL CENTER | Age: 42
End: 2022-12-22
Attending: EMERGENCY MEDICINE
Payer: COMMERCIAL

## 2022-12-22 VITALS
TEMPERATURE: 98.1 F | HEART RATE: 76 BPM | RESPIRATION RATE: 15 BRPM | OXYGEN SATURATION: 95 % | HEIGHT: 66 IN | BODY MASS INDEX: 30.82 KG/M2 | DIASTOLIC BLOOD PRESSURE: 87 MMHG | SYSTOLIC BLOOD PRESSURE: 140 MMHG | WEIGHT: 191.8 LBS

## 2022-12-22 LAB
ALBUMIN SERPL BCP-MCNC: 4.9 G/DL (ref 3.2–4.9)
ALBUMIN/GLOB SERPL: 2 G/DL
ALP SERPL-CCNC: 84 U/L (ref 30–99)
ALT SERPL-CCNC: 29 U/L (ref 2–50)
ANION GAP SERPL CALC-SCNC: 13 MMOL/L (ref 7–16)
APPEARANCE UR: CLEAR
APTT PPP: 35.5 SEC (ref 24.7–36)
AST SERPL-CCNC: 18 U/L (ref 12–45)
BASOPHILS # BLD AUTO: 1.5 % (ref 0–1.8)
BASOPHILS # BLD: 0.12 K/UL (ref 0–0.12)
BILIRUB SERPL-MCNC: 0.7 MG/DL (ref 0.1–1.5)
BILIRUB UR QL STRIP.AUTO: NEGATIVE
BUN SERPL-MCNC: 14 MG/DL (ref 8–22)
CALCIUM ALBUM COR SERPL-MCNC: 8.9 MG/DL (ref 8.5–10.5)
CALCIUM SERPL-MCNC: 9.6 MG/DL (ref 8.4–10.2)
CHLORIDE SERPL-SCNC: 105 MMOL/L (ref 96–112)
CO2 SERPL-SCNC: 22 MMOL/L (ref 20–33)
COLOR UR: YELLOW
CREAT SERPL-MCNC: 0.95 MG/DL (ref 0.5–1.4)
EKG IMPRESSION: NORMAL
EOSINOPHIL # BLD AUTO: 0.87 K/UL (ref 0–0.51)
EOSINOPHIL NFR BLD: 11 % (ref 0–6.9)
ERYTHROCYTE [DISTWIDTH] IN BLOOD BY AUTOMATED COUNT: 39.8 FL (ref 35.9–50)
GFR SERPLBLD CREATININE-BSD FMLA CKD-EPI: 103 ML/MIN/1.73 M 2
GLOBULIN SER CALC-MCNC: 2.4 G/DL (ref 1.9–3.5)
GLUCOSE SERPL-MCNC: 135 MG/DL (ref 65–99)
GLUCOSE UR STRIP.AUTO-MCNC: NEGATIVE MG/DL
HCT VFR BLD AUTO: 46.2 % (ref 42–52)
HGB BLD-MCNC: 15.8 G/DL (ref 14–18)
IMM GRANULOCYTES # BLD AUTO: 0.03 K/UL (ref 0–0.11)
IMM GRANULOCYTES NFR BLD AUTO: 0.4 % (ref 0–0.9)
INR PPP: 1.21 (ref 0.87–1.13)
KETONES UR STRIP.AUTO-MCNC: NEGATIVE MG/DL
LACTATE SERPL-SCNC: 1.5 MMOL/L (ref 0.5–2)
LEUKOCYTE ESTERASE UR QL STRIP.AUTO: NEGATIVE
LIPASE SERPL-CCNC: 50 U/L (ref 7–58)
LYMPHOCYTES # BLD AUTO: 2.98 K/UL (ref 1–4.8)
LYMPHOCYTES NFR BLD: 37.5 % (ref 22–41)
MCH RBC QN AUTO: 30.4 PG (ref 27–33)
MCHC RBC AUTO-ENTMCNC: 34.2 G/DL (ref 33.7–35.3)
MCV RBC AUTO: 88.8 FL (ref 81.4–97.8)
MICRO URNS: NORMAL
MONOCYTES # BLD AUTO: 0.65 K/UL (ref 0–0.85)
MONOCYTES NFR BLD AUTO: 8.2 % (ref 0–13.4)
NEUTROPHILS # BLD AUTO: 3.29 K/UL (ref 1.82–7.42)
NEUTROPHILS NFR BLD: 41.4 % (ref 44–72)
NITRITE UR QL STRIP.AUTO: NEGATIVE
NRBC # BLD AUTO: 0 K/UL
NRBC BLD-RTO: 0 /100 WBC
PH UR STRIP.AUTO: 7 [PH] (ref 5–8)
PLATELET # BLD AUTO: 269 K/UL (ref 164–446)
PMV BLD AUTO: 10.3 FL (ref 9–12.9)
POTASSIUM SERPL-SCNC: 3.5 MMOL/L (ref 3.6–5.5)
PROT SERPL-MCNC: 7.3 G/DL (ref 6–8.2)
PROT UR QL STRIP: NEGATIVE MG/DL
PROTHROMBIN TIME: 15.2 SEC (ref 12–14.6)
RBC # BLD AUTO: 5.2 M/UL (ref 4.7–6.1)
RBC UR QL AUTO: NEGATIVE
SODIUM SERPL-SCNC: 140 MMOL/L (ref 135–145)
SP GR UR STRIP.AUTO: 1.02
TROPONIN T SERPL-MCNC: <6 NG/L (ref 6–19)
WBC # BLD AUTO: 7.9 K/UL (ref 4.8–10.8)

## 2022-12-22 PROCEDURE — 81003 URINALYSIS AUTO W/O SCOPE: CPT

## 2022-12-22 PROCEDURE — 85730 THROMBOPLASTIN TIME PARTIAL: CPT

## 2022-12-22 PROCEDURE — 85610 PROTHROMBIN TIME: CPT

## 2022-12-22 PROCEDURE — 83605 ASSAY OF LACTIC ACID: CPT

## 2022-12-22 PROCEDURE — 84484 ASSAY OF TROPONIN QUANT: CPT

## 2022-12-22 PROCEDURE — 85025 COMPLETE CBC W/AUTO DIFF WBC: CPT

## 2022-12-22 PROCEDURE — 74177 CT ABD & PELVIS W/CONTRAST: CPT

## 2022-12-22 PROCEDURE — 71045 X-RAY EXAM CHEST 1 VIEW: CPT

## 2022-12-22 PROCEDURE — 93005 ELECTROCARDIOGRAM TRACING: CPT | Performed by: EMERGENCY MEDICINE

## 2022-12-22 PROCEDURE — 83690 ASSAY OF LIPASE: CPT

## 2022-12-22 PROCEDURE — 87040 BLOOD CULTURE FOR BACTERIA: CPT | Mod: 91

## 2022-12-22 PROCEDURE — 700117 HCHG RX CONTRAST REV CODE 255: Performed by: EMERGENCY MEDICINE

## 2022-12-22 PROCEDURE — 80053 COMPREHEN METABOLIC PANEL: CPT

## 2022-12-22 RX ADMIN — IOHEXOL 100 ML: 350 INJECTION, SOLUTION INTRAVENOUS at 02:43

## 2022-12-22 RX ADMIN — IOHEXOL 20 ML: 240 INJECTION, SOLUTION INTRATHECAL; INTRAVASCULAR; INTRAVENOUS; ORAL at 02:42

## 2022-12-22 NOTE — ED TRIAGE NOTES
"Chief Complaint   Patient presents with    Abdominal Pain     Pt having ongoing LUQ abd pain since work accident 2 months ago. States pain has worsened and having continuous nausea. Also states \"I feel like I'm yellow and don't like the color of my skin.\"   Denies any blood in stool or emesis. Denies any flank pain or urinary symptoms. No hx of jaundice.      BP (!) 178/100   Pulse 98   Temp 36.7 °C (98.1 °F) (Temporal)   Resp 18   Ht 1.676 m (5' 6\")   Wt 87 kg (191 lb 12.8 oz)   SpO2 97%   BMI 30.96 kg/m²     "

## 2022-12-22 NOTE — ED PROVIDER NOTES
"ED Provider Note    CHIEF COMPLAINT  Chief Complaint   Patient presents with    Abdominal Pain     Pt having ongoing LUQ abd pain since work accident 2 months ago. States pain has worsened and having continuous nausea. Also states \"I feel like I'm yellow and don't like the color of my skin.\"   Denies any blood in stool or emesis. Denies any flank pain or urinary symptoms. No hx of jaundice.        HPI  Jerson Hidalgo is a 42 y.o. male who presents for evaluation of a chief complaint rolling around left upper quadrant pain patient states the pain has been there since sometime in August when a board fell on him while he was working.  He notes a small scar in the area from a scratch sustained at the same time.  He states he has been having continued pain and it sometimes radiates to his left shoulder.  He also notes early satiety and feels his skin is turning \"yellow.\"  He has had no nausea, vomiting, hematemesis, or melena.  He notes no back or flank pain and no blood in the urine or dysuria.    REVIEW OF SYSTEMS  Constitutional: No fevers or chills  Skin: No rashes, abrasions, lacerations, or pruritus  HEENT: No sore throat, runny nose, sores, trouble swallowing, trouble speaking.  Neck: No neck pain, stiffness, or masses.  Chest: No pain or rashes  Pulm: No shortness of breath, cough, wheezing, stridor, or pain with inspiration/expiration  Gastrointestinal: No nausea, vomiting, diarrhea, constipation, bloating, melena, hematochezia..  Genitourinary: No dysuria or hematuria  Musculoskeletal: No recent trauma, pain, swelling, or focal weakness  Neurologic: No sensory or motor changes. No confusion or disorientation.  Heme: No bleeding or bruising problems.   Immuno: No hx of recurrent infections    PAST FAM HISTORY  History reviewed. No pertinent family history.    PAST MEDICAL HISTORY   has a past medical history of High cholesterol and Patient denies medical problems.    SOCIAL HISTORY  Social History " "    Tobacco Use    Smoking status: Never    Smokeless tobacco: Never   Vaping Use    Vaping Use: Never used   Substance and Sexual Activity    Alcohol use: Yes     Comment: Occassional    Drug use: No    Sexual activity: Not on file       SURGICAL HISTORY   has a past surgical history that includes shoulder arthroscopy.    CURRENT MEDICATIONS  Home Medications       Reviewed by Елена Joseph R.N. (Registered Nurse) on 12/21/22 at 2355  Med List Status: Not Addressed     Medication Last Dose Status   cyclobenzaprine (FLEXERIL) 10 MG TABS  Active   lidocaine (LIDODERM) 5 % Patch  Active   methocarbamol (ROBAXIN) 750 MG Tab  Active   naproxen (NAPROSYN) 375 MG Tab  Active                    ALLERGIES  No Known Allergies    PHYSICAL EXAM  VITAL SIGNS: /89   Pulse 71   Temp 36.7 °C (98.1 °F) (Temporal)   Resp 15   Ht 1.676 m (5' 6\")   Wt 87 kg (191 lb 12.8 oz)   SpO2 95%   BMI 30.96 kg/m²    Gen: Alert in no apparent distress.  HEENT: No signs of trauma, Bilateral external ears normal, Nose normal. Conjunctiva normal, Non-icteric.   Cardiovascular: Regular rate and rhythm, no murmurs.  Capillary refill less than 3 seconds to all extremities, 2+ distal pulses.  Thorax & Lungs: Normal breath sounds, No respiratory distress, No wheezing bilateral chest rise  Abdomen: Bowel sounds normal, Soft, diffuse left upper quadrant tenderness, No masses, No pulsatile masses. No Guarding or rebound  Skin: Warm, Dry.  Back: No bony tenderness, No CVA tenderness.   Extremities: Intact distal pulses, No edema  Neurologic: Alert , no facial droop, grossly normal coordination and strength  Psychiatric: Affect pleasant      LABS  Results for orders placed or performed during the hospital encounter of 12/21/22   CBC WITH DIFFERENTIAL   Result Value Ref Range    WBC 7.9 4.8 - 10.8 K/uL    RBC 5.20 4.70 - 6.10 M/uL    Hemoglobin 15.8 14.0 - 18.0 g/dL    Hematocrit 46.2 42.0 - 52.0 %    MCV 88.8 81.4 - 97.8 fL    MCH 30.4 27.0 - " 33.0 pg    MCHC 34.2 33.7 - 35.3 g/dL    RDW 39.8 35.9 - 50.0 fL    Platelet Count 269 164 - 446 K/uL    MPV 10.3 9.0 - 12.9 fL    Neutrophils-Polys 41.40 (L) 44.00 - 72.00 %    Lymphocytes 37.50 22.00 - 41.00 %    Monocytes 8.20 0.00 - 13.40 %    Eosinophils 11.00 (H) 0.00 - 6.90 %    Basophils 1.50 0.00 - 1.80 %    Immature Granulocytes 0.40 0.00 - 0.90 %    Nucleated RBC 0.00 /100 WBC    Neutrophils (Absolute) 3.29 1.82 - 7.42 K/uL    Lymphs (Absolute) 2.98 1.00 - 4.80 K/uL    Monos (Absolute) 0.65 0.00 - 0.85 K/uL    Eos (Absolute) 0.87 (H) 0.00 - 0.51 K/uL    Baso (Absolute) 0.12 0.00 - 0.12 K/uL    Immature Granulocytes (abs) 0.03 0.00 - 0.11 K/uL    NRBC (Absolute) 0.00 K/uL   COMP METABOLIC PANEL   Result Value Ref Range    Sodium 140 135 - 145 mmol/L    Potassium 3.5 (L) 3.6 - 5.5 mmol/L    Chloride 105 96 - 112 mmol/L    Co2 22 20 - 33 mmol/L    Anion Gap 13.0 7.0 - 16.0    Glucose 135 (H) 65 - 99 mg/dL    Bun 14 8 - 22 mg/dL    Creatinine 0.95 0.50 - 1.40 mg/dL    Calcium 9.6 8.4 - 10.2 mg/dL    AST(SGOT) 18 12 - 45 U/L    ALT(SGPT) 29 2 - 50 U/L    Alkaline Phosphatase 84 30 - 99 U/L    Total Bilirubin 0.7 0.1 - 1.5 mg/dL    Albumin 4.9 3.2 - 4.9 g/dL    Total Protein 7.3 6.0 - 8.2 g/dL    Globulin 2.4 1.9 - 3.5 g/dL    A-G Ratio 2.0 g/dL   TROPONIN   Result Value Ref Range    Troponin T <6 6 - 19 ng/L   LACTIC ACID   Result Value Ref Range    Lactic Acid 1.5 0.5 - 2.0 mmol/L   LIPASE   Result Value Ref Range    Lipase 50 7 - 58 U/L   URINALYSIS (UA)    Specimen: Urine   Result Value Ref Range    Color Yellow     Character Clear     Specific Gravity 1.020 <1.035    Ph 7.0 5.0 - 8.0    Glucose Negative Negative mg/dL    Ketones Negative Negative mg/dL    Protein Negative Negative mg/dL    Bilirubin Negative Negative    Nitrite Negative Negative    Leukocyte Esterase Negative Negative    Occult Blood Negative Negative    Micro Urine Req see below    APTT   Result Value Ref Range    APTT 35.5 24.7 - 36.0  sec   PROTHROMBIN TIME (INR)   Result Value Ref Range    PT 15.2 (H) 12.0 - 14.6 sec    INR 1.21 (H) 0.87 - 1.13   CORRECTED CALCIUM   Result Value Ref Range    Correct Calcium 8.9 8.5 - 10.5 mg/dL   ESTIMATED GFR   Result Value Ref Range    GFR (CKD-EPI) 103 >60 mL/min/1.73 m 2   EKG (NOW)   Result Value Ref Range    Report       [facility]    Test Date:  2022  Pt Name:    CLARA BROWN              Department: [facility]  MRN:        [id number]                      [facility]  Gender:     Male                         Technician: [id number]  :        1980                   Requested By:JOSÉ MIGUEL JONES  Order #:    [id number]                    Reading MD:    Measurements  Intervals                                Axis  Rate:       86                           P:          84  WI:         168                          QRS:        16  QRSD:       94                           T:          6  QT:         364  QTc:        436    Interpretive Statements  SINUS RHYTHM  BASELINE WANDER IN LEAD(S) II,III,aVF  Compared to ECG 2022 20:19:33  ST (T wave) deviation no longer present         RADIOLOGY  CT-ABDOMEN-PELVIS WITH   Final Result         1.  Hepatomegaly      DX-CHEST-PORTABLE (1 VIEW)   Final Result         1.  No acute cardiopulmonary disease.          COURSE & MEDICAL DECISION MAKING  Patient arrives for evaluation of symptoms that seem unlikely to be related to the remote trauma he describes but he is also describing symptoms that could suggest a mass or neoplasm.  He notes early satiety and feeling of fullness in this area.  He also notes occasional radiation to his left shoulder which could indicate diaphragmatic irritation.  We will need to perform imaging of some sort however I will wait for labs to return.  If there is signs of a biliary obstruction, ultrasound will be ordered.  If not, CT will be obtained.    Patient's labs are inconsistent with biliary  obstruction and therefore CT imaging will be obtained.  I will order p.o. contrast as well for an optimal study.    Patient CT and labs are reassuring and there is no evidence for any emergent problems.  Specifically, there is no evidence for neoplastic process or traumatic issue.  Patient states understanding of this.  Patient was sleeping upon reevaluation but woke easily and was in no distress.  He states understanding that he is to follow-up with his primary care physician as this may be a gastric issue such as an ulcer.  Regardless, the patient has no stigmata of GI bleeding and there is certainly no evidence for perforation.  We will treat supportively at home and contact his primary care physician soon as possible.  He will return if his symptoms worsen or change in any way.  At this point I very much doubt ACS as the cause of his symptomology aced on the exam and findings today.    FINAL IMPRESSION  1. Left upper quadrant abdominal pain        Electronically signed by: Lenin Corcoran M.D., 12/21/2022 11:55 PM

## 2022-12-27 LAB
BACTERIA BLD CULT: NORMAL
SIGNIFICANT IND 70042: NORMAL
SITE SITE: NORMAL
SOURCE SOURCE: NORMAL

## 2022-12-28 LAB
BACTERIA BLD CULT: NORMAL
SIGNIFICANT IND 70042: NORMAL
SITE SITE: NORMAL
SOURCE SOURCE: NORMAL

## 2023-06-01 ENCOUNTER — HOSPITAL ENCOUNTER (EMERGENCY)
Facility: MEDICAL CENTER | Age: 43
End: 2023-06-02
Attending: STUDENT IN AN ORGANIZED HEALTH CARE EDUCATION/TRAINING PROGRAM
Payer: COMMERCIAL

## 2023-06-01 ENCOUNTER — APPOINTMENT (OUTPATIENT)
Dept: RADIOLOGY | Facility: MEDICAL CENTER | Age: 43
End: 2023-06-01
Attending: STUDENT IN AN ORGANIZED HEALTH CARE EDUCATION/TRAINING PROGRAM

## 2023-06-01 DIAGNOSIS — R11.0 NAUSEA: ICD-10-CM

## 2023-06-01 DIAGNOSIS — R51.9 NONINTRACTABLE HEADACHE, UNSPECIFIED CHRONICITY PATTERN, UNSPECIFIED HEADACHE TYPE: ICD-10-CM

## 2023-06-01 DIAGNOSIS — R07.9 ACUTE CHEST PAIN: ICD-10-CM

## 2023-06-01 LAB
ALBUMIN SERPL BCP-MCNC: 4.9 G/DL (ref 3.2–4.9)
ALBUMIN/GLOB SERPL: 2.1 G/DL
ALP SERPL-CCNC: 100 U/L (ref 30–99)
ALT SERPL-CCNC: 31 U/L (ref 2–50)
ANION GAP SERPL CALC-SCNC: 16 MMOL/L (ref 7–16)
AST SERPL-CCNC: 17 U/L (ref 12–45)
BASOPHILS # BLD AUTO: 1.5 % (ref 0–1.8)
BASOPHILS # BLD: 0.1 K/UL (ref 0–0.12)
BILIRUB SERPL-MCNC: 0.5 MG/DL (ref 0.1–1.5)
BUN SERPL-MCNC: 14 MG/DL (ref 8–22)
CALCIUM ALBUM COR SERPL-MCNC: 8.9 MG/DL (ref 8.5–10.5)
CALCIUM SERPL-MCNC: 9.6 MG/DL (ref 8.5–10.5)
CHLORIDE SERPL-SCNC: 104 MMOL/L (ref 96–112)
CO2 SERPL-SCNC: 21 MMOL/L (ref 20–33)
CREAT SERPL-MCNC: 0.78 MG/DL (ref 0.5–1.4)
EKG IMPRESSION: NORMAL
EOSINOPHIL # BLD AUTO: 0.6 K/UL (ref 0–0.51)
EOSINOPHIL NFR BLD: 9.3 % (ref 0–6.9)
ERYTHROCYTE [DISTWIDTH] IN BLOOD BY AUTOMATED COUNT: 41.5 FL (ref 35.9–50)
GFR SERPLBLD CREATININE-BSD FMLA CKD-EPI: 115 ML/MIN/1.73 M 2
GLOBULIN SER CALC-MCNC: 2.3 G/DL (ref 1.9–3.5)
GLUCOSE SERPL-MCNC: 124 MG/DL (ref 65–99)
HCT VFR BLD AUTO: 50.3 % (ref 42–52)
HGB BLD-MCNC: 17.3 G/DL (ref 14–18)
IMM GRANULOCYTES # BLD AUTO: 0.02 K/UL (ref 0–0.11)
IMM GRANULOCYTES NFR BLD AUTO: 0.3 % (ref 0–0.9)
LIPASE SERPL-CCNC: 45 U/L (ref 11–82)
LYMPHOCYTES # BLD AUTO: 2.12 K/UL (ref 1–4.8)
LYMPHOCYTES NFR BLD: 32.8 % (ref 22–41)
MCH RBC QN AUTO: 30.7 PG (ref 27–33)
MCHC RBC AUTO-ENTMCNC: 34.4 G/DL (ref 32.3–36.5)
MCV RBC AUTO: 89.2 FL (ref 81.4–97.8)
MONOCYTES # BLD AUTO: 0.5 K/UL (ref 0–0.85)
MONOCYTES NFR BLD AUTO: 7.7 % (ref 0–13.4)
NEUTROPHILS # BLD AUTO: 3.13 K/UL (ref 1.82–7.42)
NEUTROPHILS NFR BLD: 48.4 % (ref 44–72)
NRBC # BLD AUTO: 0 K/UL
NRBC BLD-RTO: 0 /100 WBC (ref 0–0.2)
PLATELET # BLD AUTO: 279 K/UL (ref 164–446)
PMV BLD AUTO: 10.3 FL (ref 9–12.9)
POTASSIUM SERPL-SCNC: 3.5 MMOL/L (ref 3.6–5.5)
PROT SERPL-MCNC: 7.2 G/DL (ref 6–8.2)
RBC # BLD AUTO: 5.64 M/UL (ref 4.7–6.1)
SODIUM SERPL-SCNC: 141 MMOL/L (ref 135–145)
TROPONIN T SERPL-MCNC: <6 NG/L (ref 6–19)
TROPONIN T SERPL-MCNC: <6 NG/L (ref 6–19)
WBC # BLD AUTO: 6.5 K/UL (ref 4.8–10.8)

## 2023-06-01 PROCEDURE — 96374 THER/PROPH/DIAG INJ IV PUSH: CPT

## 2023-06-01 PROCEDURE — 99285 EMERGENCY DEPT VISIT HI MDM: CPT

## 2023-06-01 PROCEDURE — 700111 HCHG RX REV CODE 636 W/ 250 OVERRIDE (IP): Performed by: STUDENT IN AN ORGANIZED HEALTH CARE EDUCATION/TRAINING PROGRAM

## 2023-06-01 PROCEDURE — 84484 ASSAY OF TROPONIN QUANT: CPT

## 2023-06-01 PROCEDURE — 71045 X-RAY EXAM CHEST 1 VIEW: CPT

## 2023-06-01 PROCEDURE — 700102 HCHG RX REV CODE 250 W/ 637 OVERRIDE(OP): Performed by: STUDENT IN AN ORGANIZED HEALTH CARE EDUCATION/TRAINING PROGRAM

## 2023-06-01 PROCEDURE — 80053 COMPREHEN METABOLIC PANEL: CPT

## 2023-06-01 PROCEDURE — 83690 ASSAY OF LIPASE: CPT

## 2023-06-01 PROCEDURE — 85025 COMPLETE CBC W/AUTO DIFF WBC: CPT

## 2023-06-01 PROCEDURE — A9270 NON-COVERED ITEM OR SERVICE: HCPCS | Performed by: STUDENT IN AN ORGANIZED HEALTH CARE EDUCATION/TRAINING PROGRAM

## 2023-06-01 PROCEDURE — 93005 ELECTROCARDIOGRAM TRACING: CPT | Performed by: STUDENT IN AN ORGANIZED HEALTH CARE EDUCATION/TRAINING PROGRAM

## 2023-06-01 PROCEDURE — 93005 ELECTROCARDIOGRAM TRACING: CPT

## 2023-06-01 PROCEDURE — 36415 COLL VENOUS BLD VENIPUNCTURE: CPT

## 2023-06-01 RX ORDER — METOCLOPRAMIDE HYDROCHLORIDE 5 MG/ML
10 INJECTION INTRAMUSCULAR; INTRAVENOUS ONCE
Status: COMPLETED | OUTPATIENT
Start: 2023-06-01 | End: 2023-06-01

## 2023-06-01 RX ORDER — ACETAMINOPHEN 325 MG/1
650 TABLET ORAL ONCE
Status: COMPLETED | OUTPATIENT
Start: 2023-06-01 | End: 2023-06-01

## 2023-06-01 RX ADMIN — METOCLOPRAMIDE 10 MG: 5 INJECTION, SOLUTION INTRAMUSCULAR; INTRAVENOUS at 23:02

## 2023-06-01 RX ADMIN — LIDOCAINE HYDROCHLORIDE 30 ML: 20 SOLUTION OROPHARYNGEAL at 23:02

## 2023-06-01 RX ADMIN — ACETAMINOPHEN 650 MG: 325 TABLET, FILM COATED ORAL at 23:02

## 2023-06-02 VITALS
TEMPERATURE: 98 F | OXYGEN SATURATION: 94 % | RESPIRATION RATE: 18 BRPM | BODY MASS INDEX: 30.22 KG/M2 | HEIGHT: 66 IN | HEART RATE: 69 BPM | WEIGHT: 188.05 LBS | DIASTOLIC BLOOD PRESSURE: 69 MMHG | SYSTOLIC BLOOD PRESSURE: 118 MMHG

## 2023-06-02 RX ORDER — IBUPROFEN 600 MG/1
600 TABLET ORAL EVERY 6 HOURS PRN
Qty: 30 TABLET | Refills: 0 | Status: SHIPPED | OUTPATIENT
Start: 2023-06-02 | End: 2024-01-21

## 2023-06-02 RX ORDER — ACETAMINOPHEN 500 MG
500-1000 TABLET ORAL EVERY 6 HOURS PRN
Qty: 30 TABLET | Refills: 0 | Status: SHIPPED | OUTPATIENT
Start: 2023-06-02 | End: 2024-01-21

## 2023-06-02 RX ORDER — METOCLOPRAMIDE 5 MG/1
5 TABLET ORAL EVERY 6 HOURS PRN
Qty: 20 TABLET | Refills: 0 | Status: SHIPPED | OUTPATIENT
Start: 2023-06-02 | End: 2024-01-21

## 2023-06-02 NOTE — ED TRIAGE NOTES
"Chief Complaint   Patient presents with    Abdominal Pain     L abd pain staring yesterday. Endorses nausea. Denies vomiting.     Chest Pain     Abd pain radiating to L chest.  Intermittent SOB staring yesterday.     Headache     Staring yesterday associated with CP and ABD pain      BP (!) 185/97   Pulse 78   Temp 36.2 °C (97.2 °F) (Temporal)   Resp 18   Ht 1.676 m (5' 6\")   Wt 85.3 kg (188 lb 0.8 oz)   SpO2 97%   BMI 30.35 kg/m²     "

## 2023-06-02 NOTE — ED NOTES
Patient provided discharge instructions. Patient verbalized understanding. Patient leaving ER in stable condition. Patient ambulatory with steady gait. IV removed. Daughter at bedside volunteered as .

## 2023-06-02 NOTE — ED PROVIDER NOTES
ED Provider Note    CHIEF COMPLAINT  Chief Complaint   Patient presents with    Abdominal Pain     L abd pain staring yesterday. Endorses nausea. Denies vomiting.     Chest Pain     Abd pain radiating to L chest.  Intermittent SOB staring yesterday.     Headache     Staring yesterday associated with CP and ABD pain        EXTERNAL RECORDS REVIEWED  No recent notes available for review    HPI/ROS  LIMITATION TO HISTORY   Select: Language Malagasy,  Used   OUTSIDE HISTORIAN(S):  Family reports that the patient has multiple complaints including headache and abdominal pain    Rex Hidalgo is a 43 y.o. male who presents evaluation of multiple complaints.  He mostly seems concerned about left upper quadrant abdominal pain which described as a burning sensation occasional radiation into his chest with associated nausea no vomiting.  He has been constant since yesterday evening.  He does state that he is also had a headache which described as a throbbing sensation the left side of his head.  Headache was not sudden onset nor the worst of his life denies any associated neck stiffness weakness in extremities visual changes trauma or falls.    PAST MEDICAL HISTORY       SURGICAL HISTORY  patient denies any surgical history    FAMILY HISTORY  History reviewed. No pertinent family history.    SOCIAL HISTORY  Social History     Tobacco Use    Smoking status: Never    Smokeless tobacco: Never   Substance and Sexual Activity    Alcohol use: Yes     Comment: beer on occasion     Drug use: Not on file    Sexual activity: Not on file       CURRENT MEDICATIONS  Home Medications       Reviewed by Raysa Al R.N. (Registered Nurse) on 06/01/23 at 2182  Med List Status: Not Addressed     Medication Last Dose Status        Patient Jose Taking any Medications                           ALLERGIES  No Known Allergies    PHYSICAL EXAM  VITAL SIGNS: /69   Pulse 69   Temp 36.7 °C (98 °F)   Resp 18   " Ht 1.676 m (5' 6\")   Wt 85.3 kg (188 lb 0.8 oz)   SpO2 94%   BMI 30.35 kg/m²    Pulse ox interpretation: I interpret this pulse ox as normal.  VITALS - vital signs documented prior to this note have been reviewed and noted,  GENERAL - awake, alert, oriented, GCS 15, no apparent distress, non-toxic  appearing  HEENT - normocephalic, atraumatic, pupils equal, sclera anicteric, mucus  membranes moist  NECK - supple, no meningismus, full active range of motion, trachea midline  CARDIOVASCULAR - regular rate/rhythm, no murmurs/gallops/rubs  PULMONARY - no respiratory distress, speaking in full sentences, clear to  auscultation bilaterally, no wheezing/ronchi/rales, no accessory muscle use  GASTROINTESTINAL -upper quadrant tenderness otherwise soft, non-tender, non-distended, no rebound, guarding,  or peritonitis  GENITOURINARY - Deferred  NEUROLOGIC -cranial nerves II through XII are intact pupils are equally round reactive to light right upper and left upper extremity hand , flexion at elbow, extension at the elbow 5 out of 5,   right lower left lower extremity leg raise, plantar flexion, dorsiflexion 5 out of 5 bilaterally, sensation is grossly intact in all extremities patellar DTRs are 2+ bilaterally no truncal ataxia normal finger-to-nose no appreciable papilledema on funduscopic exam NIH 0 MUSCULOSKELETAL - no obvious asymmetry or deformities present  EXTREMITIES - warm, well-perfused, no cyanosis or significant edema  DERMATOLOGIC - warm, dry, no rashes, no jaundice  PSYCHIATRIC - normal affect, normal insight, normal concentration    DIAGNOSTIC STUDIES / PROCEDURES  EKG  I have independently interpreted this EKG    Report   Date Value Ref Range Status   06/01/2023       [facility]    Test Date:  2023-06-01  Pt Name:    ISIDRO BROWN      Department: ER  MRN:        [id number]                      Room:  Gender:     Male                         Technician: " [id number]  :        1980                   Requested By:ER TRIAGE PROTOCOL  Order #:    [id number]                    Reading MD: Daniel Perez    Measurements  Intervals                                Axis  Rate:       81                           P:          66  CA:         153                          QRS:        12  QRSD:       100                          T:          6  QT:         367  QTc:        426    Interpretive Statements  Sinus rhythm probable normal early repol pattern  Compared to ECG 2009 23:33:41  No acute Ischemic changes  Unchanged when compared to prior  Electronically Signed On 2023 22:48:25 PDT by Daniel Perez              LABS  Labs Reviewed   CBC WITH DIFFERENTIAL - Abnormal; Notable for the following components:       Result Value    Eosinophils 9.30 (*)     Eos (Absolute) 0.60 (*)     All other components within normal limits    Narrative:     Biotin intake of greater than 5 mg per day may interfere with  troponin levels, causing false low values.   COMP METABOLIC PANEL - Abnormal; Notable for the following components:    Potassium 3.5 (*)     Glucose 124 (*)     Alkaline Phosphatase 100 (*)     All other components within normal limits    Narrative:     Biotin intake of greater than 5 mg per day may interfere with  troponin levels, causing false low values.   TROPONIN    Narrative:     Biotin intake of greater than 5 mg per day may interfere with  troponin levels, causing false low values.   TROPONIN    Narrative:     Biotin intake of greater than 5 mg per day may interfere with  troponin levels, causing false low values.   ESTIMATED GFR    Narrative:     Biotin intake of greater than 5 mg per day may interfere with  troponin levels, causing false low values.   CORRECTED CALCIUM    Narrative:     Biotin intake of greater than 5 mg per day may interfere with  troponin levels, causing false low values.   LIPASE    Narrative:     Biotin intake of greater than 5 mg per  day may interfere with  troponin levels, causing false low values.   LIPASE        RADIOLOGY  I have independently interpreted the diagnostic imaging associated with this visit and am waiting the final reading from the radiologist.   My preliminary interpretation is as follows: No acute cardiopulmonary process  Radiologist interpretation:   DX-CHEST-PORTABLE (1 VIEW)   Final Result         1.  No acute cardiopulmonary disease.           COURSE & MEDICAL DECISION MAKING    ED Observation Status? Yes; I am placing the patient in to an observation status due to a diagnostic uncertainty as well as therapeutic intensity. Patient placed in observation status at 21:37 AM, 6/1/2023.     Observation plan is as follows: Serial troponins evaluation for improvement of his headache    Evaluation@0002 patient's headache is resolved he is feeling much better repeat abdominal exam is reassuring and serial troponins have been negative thus I considered discharge reasonable    Upon Reevaluation, the patient's condition has: Improved; and will be discharged.    Patient discharged from ED Observation status at 0002 (Time) 6/2 (Date).     INITIAL ASSESSMENT, COURSE AND PLAN  Differential initially included was not limited to tension-type headache migraine-type headache cluster type headache less likely temporal arteritis pseudotumor cerebri intracranial mass intracranial bleed meningitis encephalitis subarachnoid hemorrhage pancreatitis cholecystitis gastritis colitis diverticulitis lower lobe pneumonia atypical ACS pneumonia pleural effusion pneumothorax among many other considerations      Care Narrative: Patient presented for evaluation of multiple complaints including headache chest pain and abdominal pain.  EKG obtained in triage showed no acute ischemic changes serial troponins have been negative patient's heart score is 2 I do have low concern for atypical ACS at this point patient is PERC negative doubt PE no ripping tearing  back pain numbness tingling weakness in extremities doubt aortic dissection.  Patient was treated with a GI cocktail Tylenol and Reglan with complete resolution of his symptoms his repeat abdominal exam is reassuring demonstrating no rebound guarding or peritonitis thus will defer a CT abdomen pelvis in regards to his abdominal pain.  Patient's headache resolved after Reglan and Tylenol he has a normal reassuring neurologic exam in the emergency department may represent a migraine type headache given the nausea vomiting given that his headache is resolved he has a normal neurologic exam do not see an indication for CT head at this point.  Patient will be discharged with Reglan Tylenol Motrin strict return should he develop persistent abdominal pain worsening or severe headache or any other new or concerning symptoms other return precautions were discussed and the patient was discharged in stable condition          ADDITIONAL PROBLEM LIST  Headache chest pain abdominal pain  DISPOSITION AND DISCUSSIONS      Escalation of care considered, and ultimately not performed:IV fluids and diagnostic imaging    Barriers to care at this time, including but not limited to: Patient does not have established PCP.     Decision tools and prescription drugs considered including, but not limited to:  Antiemetics .  Heart score of 2    FINAL DIAGNOSIS  1. Acute chest pain    2. Nonintractable headache, unspecified chronicity pattern, unspecified headache type    3. Nausea           Electronically signed by: Daniel Balderas D.O., 6/1/2023 10:43 PM

## 2023-08-04 ENCOUNTER — HOSPITAL ENCOUNTER (OUTPATIENT)
Dept: RADIOLOGY | Facility: MEDICAL CENTER | Age: 43
End: 2023-08-04
Attending: FAMILY MEDICINE
Payer: COMMERCIAL

## 2023-08-04 DIAGNOSIS — R10.10 UPPER ABDOMINAL PAIN: ICD-10-CM

## 2023-08-04 PROCEDURE — 74183 MRI ABD W/O CNTR FLWD CNTR: CPT

## 2023-08-04 PROCEDURE — A9579 GAD-BASE MR CONTRAST NOS,1ML: HCPCS

## 2023-08-04 PROCEDURE — 700117 HCHG RX CONTRAST REV CODE 255

## 2023-08-04 RX ADMIN — GADOTERIDOL 18 ML: 279.3 INJECTION, SOLUTION INTRAVENOUS at 19:20

## 2023-11-01 ENCOUNTER — HOSPITAL ENCOUNTER (OUTPATIENT)
Dept: RADIOLOGY | Facility: MEDICAL CENTER | Age: 43
End: 2023-11-01
Attending: NURSE PRACTITIONER
Payer: COMMERCIAL

## 2023-11-01 DIAGNOSIS — R10.32 LLQ ABDOMINAL PAIN: ICD-10-CM

## 2023-11-01 PROCEDURE — 76700 US EXAM ABDOM COMPLETE: CPT

## 2024-01-21 ENCOUNTER — HOSPITAL ENCOUNTER (INPATIENT)
Facility: MEDICAL CENTER | Age: 44
LOS: 3 days | DRG: 872 | End: 2024-01-24
Attending: EMERGENCY MEDICINE | Admitting: HOSPITALIST
Payer: COMMERCIAL

## 2024-01-21 ENCOUNTER — APPOINTMENT (OUTPATIENT)
Dept: RADIOLOGY | Facility: MEDICAL CENTER | Age: 44
DRG: 872 | End: 2024-01-21
Attending: EMERGENCY MEDICINE
Payer: COMMERCIAL

## 2024-01-21 DIAGNOSIS — K52.9 COLITIS: ICD-10-CM

## 2024-01-21 PROBLEM — A41.9 SEPSIS (HCC): Status: ACTIVE | Noted: 2024-01-21

## 2024-01-21 PROBLEM — E78.2 MIXED HYPERLIPIDEMIA: Status: ACTIVE | Noted: 2024-01-21

## 2024-01-21 PROBLEM — E87.20 METABOLIC ACIDOSIS: Status: ACTIVE | Noted: 2024-01-21

## 2024-01-21 PROBLEM — I10 PRIMARY HYPERTENSION: Status: ACTIVE | Noted: 2024-01-21

## 2024-01-21 PROBLEM — R73.9 HYPERGLYCEMIA: Status: ACTIVE | Noted: 2024-01-21

## 2024-01-21 PROBLEM — E87.6 HYPOKALEMIA: Status: ACTIVE | Noted: 2024-01-21

## 2024-01-21 LAB
ALBUMIN SERPL BCP-MCNC: 4.6 G/DL (ref 3.2–4.9)
ALBUMIN/GLOB SERPL: 1.4 G/DL
ALP SERPL-CCNC: 88 U/L (ref 30–99)
ALT SERPL-CCNC: 20 U/L (ref 2–50)
ANION GAP SERPL CALC-SCNC: 18 MMOL/L (ref 7–16)
APTT PPP: 35.2 SEC (ref 24.7–36)
AST SERPL-CCNC: 12 U/L (ref 12–45)
BASOPHILS # BLD AUTO: 0.7 % (ref 0–1.8)
BASOPHILS # BLD: 0.08 K/UL (ref 0–0.12)
BILIRUB SERPL-MCNC: 1 MG/DL (ref 0.1–1.5)
BUN SERPL-MCNC: 10 MG/DL (ref 8–22)
C DIFF DNA SPEC QL NAA+PROBE: NEGATIVE
C DIFF TOX GENS STL QL NAA+PROBE: NEGATIVE
CALCIUM ALBUM COR SERPL-MCNC: 9 MG/DL (ref 8.5–10.5)
CALCIUM SERPL-MCNC: 9.5 MG/DL (ref 8.4–10.2)
CHLORIDE SERPL-SCNC: 98 MMOL/L (ref 96–112)
CO2 SERPL-SCNC: 17 MMOL/L (ref 20–33)
CREAT SERPL-MCNC: 0.88 MG/DL (ref 0.5–1.4)
EOSINOPHIL # BLD AUTO: 0.1 K/UL (ref 0–0.51)
EOSINOPHIL NFR BLD: 0.8 % (ref 0–6.9)
ERYTHROCYTE [DISTWIDTH] IN BLOOD BY AUTOMATED COUNT: 37 FL (ref 35.9–50)
EST. AVERAGE GLUCOSE BLD GHB EST-MCNC: 134 MG/DL
FLUAV RNA SPEC QL NAA+PROBE: NEGATIVE
FLUBV RNA SPEC QL NAA+PROBE: NEGATIVE
GFR SERPLBLD CREATININE-BSD FMLA CKD-EPI: 110 ML/MIN/1.73 M 2
GLOBULIN SER CALC-MCNC: 3.2 G/DL (ref 1.9–3.5)
GLUCOSE SERPL-MCNC: 171 MG/DL (ref 65–99)
HBA1C MFR BLD: 6.3 % (ref 4–5.6)
HCT VFR BLD AUTO: 49.7 % (ref 42–52)
HGB BLD-MCNC: 17.4 G/DL (ref 14–18)
IMM GRANULOCYTES # BLD AUTO: 0.04 K/UL (ref 0–0.11)
IMM GRANULOCYTES NFR BLD AUTO: 0.3 % (ref 0–0.9)
INR PPP: 1.21 (ref 0.87–1.13)
LACTATE SERPL-SCNC: 1.3 MMOL/L (ref 0.5–2)
LACTATE SERPL-SCNC: 2.1 MMOL/L (ref 0.5–2)
LACTATE SERPL-SCNC: 3 MMOL/L (ref 0.5–2)
LACTATE SERPL-SCNC: 3.5 MMOL/L (ref 0.5–2)
LYMPHOCYTES # BLD AUTO: 1.24 K/UL (ref 1–4.8)
LYMPHOCYTES NFR BLD: 10.3 % (ref 22–41)
MCH RBC QN AUTO: 29.9 PG (ref 27–33)
MCHC RBC AUTO-ENTMCNC: 35 G/DL (ref 32.3–36.5)
MCV RBC AUTO: 85.5 FL (ref 81.4–97.8)
MONOCYTES # BLD AUTO: 1.34 K/UL (ref 0–0.85)
MONOCYTES NFR BLD AUTO: 11.1 % (ref 0–13.4)
NEUTROPHILS # BLD AUTO: 9.27 K/UL (ref 1.82–7.42)
NEUTROPHILS NFR BLD: 76.8 % (ref 44–72)
NRBC # BLD AUTO: 0 K/UL
NRBC BLD-RTO: 0 /100 WBC (ref 0–0.2)
PLATELET # BLD AUTO: 218 K/UL (ref 164–446)
PMV BLD AUTO: 10 FL (ref 9–12.9)
POTASSIUM SERPL-SCNC: 3.5 MMOL/L (ref 3.6–5.5)
PROT SERPL-MCNC: 7.8 G/DL (ref 6–8.2)
PROTHROMBIN TIME: 15.8 SEC (ref 12–14.6)
RBC # BLD AUTO: 5.81 M/UL (ref 4.7–6.1)
RSV RNA SPEC QL NAA+PROBE: NEGATIVE
SARS-COV-2 RNA RESP QL NAA+PROBE: NOTDETECTED
SODIUM SERPL-SCNC: 133 MMOL/L (ref 135–145)
SPECIMEN SOURCE: NORMAL
WBC # BLD AUTO: 12.1 K/UL (ref 4.8–10.8)

## 2024-01-21 PROCEDURE — 770020 HCHG ROOM/CARE - TELE (206)

## 2024-01-21 PROCEDURE — 87899 AGENT NOS ASSAY W/OPTIC: CPT

## 2024-01-21 PROCEDURE — A9270 NON-COVERED ITEM OR SERVICE: HCPCS | Mod: JZ | Performed by: HOSPITALIST

## 2024-01-21 PROCEDURE — 85025 COMPLETE CBC W/AUTO DIFF WBC: CPT

## 2024-01-21 PROCEDURE — 87493 C DIFF AMPLIFIED PROBE: CPT

## 2024-01-21 PROCEDURE — 700102 HCHG RX REV CODE 250 W/ 637 OVERRIDE(OP): Performed by: EMERGENCY MEDICINE

## 2024-01-21 PROCEDURE — 700111 HCHG RX REV CODE 636 W/ 250 OVERRIDE (IP): Performed by: HOSPITALIST

## 2024-01-21 PROCEDURE — 83036 HEMOGLOBIN GLYCOSYLATED A1C: CPT

## 2024-01-21 PROCEDURE — 700105 HCHG RX REV CODE 258: Performed by: EMERGENCY MEDICINE

## 2024-01-21 PROCEDURE — 94760 N-INVAS EAR/PLS OXIMETRY 1: CPT

## 2024-01-21 PROCEDURE — 0241U HCHG SARS-COV-2 COVID-19 NFCT DS RESP RNA 4 TRGT MIC: CPT

## 2024-01-21 PROCEDURE — 700117 HCHG RX CONTRAST REV CODE 255: Performed by: EMERGENCY MEDICINE

## 2024-01-21 PROCEDURE — 85730 THROMBOPLASTIN TIME PARTIAL: CPT

## 2024-01-21 PROCEDURE — 700105 HCHG RX REV CODE 258: Performed by: HOSPITALIST

## 2024-01-21 PROCEDURE — C9113 INJ PANTOPRAZOLE SODIUM, VIA: HCPCS | Performed by: EMERGENCY MEDICINE

## 2024-01-21 PROCEDURE — 87045 FECES CULTURE AEROBIC BACT: CPT

## 2024-01-21 PROCEDURE — 96375 TX/PRO/DX INJ NEW DRUG ADDON: CPT

## 2024-01-21 PROCEDURE — 99285 EMERGENCY DEPT VISIT HI MDM: CPT

## 2024-01-21 PROCEDURE — 700102 HCHG RX REV CODE 250 W/ 637 OVERRIDE(OP): Mod: JZ | Performed by: HOSPITALIST

## 2024-01-21 PROCEDURE — 36415 COLL VENOUS BLD VENIPUNCTURE: CPT

## 2024-01-21 PROCEDURE — 96365 THER/PROPH/DIAG IV INF INIT: CPT

## 2024-01-21 PROCEDURE — A9270 NON-COVERED ITEM OR SERVICE: HCPCS | Performed by: EMERGENCY MEDICINE

## 2024-01-21 PROCEDURE — 87046 STOOL CULTR AEROBIC BACT EA: CPT

## 2024-01-21 PROCEDURE — 83605 ASSAY OF LACTIC ACID: CPT | Mod: 91

## 2024-01-21 PROCEDURE — 99223 1ST HOSP IP/OBS HIGH 75: CPT | Performed by: HOSPITALIST

## 2024-01-21 PROCEDURE — 80053 COMPREHEN METABOLIC PANEL: CPT

## 2024-01-21 PROCEDURE — 700111 HCHG RX REV CODE 636 W/ 250 OVERRIDE (IP): Performed by: EMERGENCY MEDICINE

## 2024-01-21 PROCEDURE — 71045 X-RAY EXAM CHEST 1 VIEW: CPT

## 2024-01-21 PROCEDURE — 85610 PROTHROMBIN TIME: CPT

## 2024-01-21 PROCEDURE — 74177 CT ABD & PELVIS W/CONTRAST: CPT

## 2024-01-21 PROCEDURE — 87040 BLOOD CULTURE FOR BACTERIA: CPT

## 2024-01-21 PROCEDURE — 700101 HCHG RX REV CODE 250: Performed by: HOSPITALIST

## 2024-01-21 RX ORDER — OXYCODONE HYDROCHLORIDE 5 MG/1
2.5 TABLET ORAL
Status: DISCONTINUED | OUTPATIENT
Start: 2024-01-21 | End: 2024-01-24 | Stop reason: HOSPADM

## 2024-01-21 RX ORDER — ATORVASTATIN CALCIUM 20 MG/1
20 TABLET, FILM COATED ORAL NIGHTLY
COMMUNITY

## 2024-01-21 RX ORDER — ONDANSETRON 4 MG/1
4 TABLET, ORALLY DISINTEGRATING ORAL EVERY 4 HOURS PRN
Status: DISCONTINUED | OUTPATIENT
Start: 2024-01-21 | End: 2024-01-24 | Stop reason: HOSPADM

## 2024-01-21 RX ORDER — ONDANSETRON 2 MG/ML
4 INJECTION INTRAMUSCULAR; INTRAVENOUS EVERY 4 HOURS PRN
Status: DISCONTINUED | OUTPATIENT
Start: 2024-01-21 | End: 2024-01-24 | Stop reason: HOSPADM

## 2024-01-21 RX ORDER — OXYCODONE HYDROCHLORIDE 5 MG/1
5 TABLET ORAL
Status: DISCONTINUED | OUTPATIENT
Start: 2024-01-21 | End: 2024-01-24 | Stop reason: HOSPADM

## 2024-01-21 RX ORDER — AMLODIPINE BESYLATE 5 MG/1
5 TABLET ORAL DAILY
Status: DISCONTINUED | OUTPATIENT
Start: 2024-01-21 | End: 2024-01-24 | Stop reason: HOSPADM

## 2024-01-21 RX ORDER — SODIUM CHLORIDE 9 MG/ML
1000 INJECTION, SOLUTION INTRAVENOUS ONCE
Status: COMPLETED | OUTPATIENT
Start: 2024-01-21 | End: 2024-01-21

## 2024-01-21 RX ORDER — POLYETHYLENE GLYCOL 3350 17 G/17G
17 POWDER, FOR SOLUTION ORAL DAILY
COMMUNITY

## 2024-01-21 RX ORDER — METRONIDAZOLE 500 MG/100ML
500 INJECTION, SOLUTION INTRAVENOUS ONCE
Status: COMPLETED | OUTPATIENT
Start: 2024-01-21 | End: 2024-01-21

## 2024-01-21 RX ORDER — SODIUM CHLORIDE, SODIUM LACTATE, POTASSIUM CHLORIDE, AND CALCIUM CHLORIDE .6; .31; .03; .02 G/100ML; G/100ML; G/100ML; G/100ML
500 INJECTION, SOLUTION INTRAVENOUS
Status: DISCONTINUED | OUTPATIENT
Start: 2024-01-21 | End: 2024-01-24 | Stop reason: HOSPADM

## 2024-01-21 RX ORDER — BISACODYL 10 MG
10 SUPPOSITORY, RECTAL RECTAL
Status: DISCONTINUED | OUTPATIENT
Start: 2024-01-21 | End: 2024-01-21

## 2024-01-21 RX ORDER — SODIUM CHLORIDE, SODIUM LACTATE, POTASSIUM CHLORIDE, CALCIUM CHLORIDE 600; 310; 30; 20 MG/100ML; MG/100ML; MG/100ML; MG/100ML
INJECTION, SOLUTION INTRAVENOUS CONTINUOUS
Status: DISCONTINUED | OUTPATIENT
Start: 2024-01-21 | End: 2024-01-22

## 2024-01-21 RX ORDER — ACETAMINOPHEN 325 MG/1
650 TABLET ORAL EVERY 6 HOURS PRN
Status: DISCONTINUED | OUTPATIENT
Start: 2024-01-21 | End: 2024-01-24 | Stop reason: HOSPADM

## 2024-01-21 RX ORDER — PROCHLORPERAZINE EDISYLATE 5 MG/ML
5-10 INJECTION INTRAMUSCULAR; INTRAVENOUS EVERY 4 HOURS PRN
Status: DISCONTINUED | OUTPATIENT
Start: 2024-01-21 | End: 2024-01-24 | Stop reason: HOSPADM

## 2024-01-21 RX ORDER — ACETAMINOPHEN 325 MG/1
650 TABLET ORAL ONCE
Status: COMPLETED | OUTPATIENT
Start: 2024-01-21 | End: 2024-01-21

## 2024-01-21 RX ORDER — DOCUSATE SODIUM 100 MG/1
100 CAPSULE, LIQUID FILLED ORAL 2 TIMES DAILY PRN
COMMUNITY

## 2024-01-21 RX ORDER — HYDROMORPHONE HYDROCHLORIDE 1 MG/ML
0.5 INJECTION, SOLUTION INTRAMUSCULAR; INTRAVENOUS; SUBCUTANEOUS
Status: DISCONTINUED | OUTPATIENT
Start: 2024-01-21 | End: 2024-01-24 | Stop reason: HOSPADM

## 2024-01-21 RX ORDER — CEFTRIAXONE 2 G/1
2000 INJECTION, POWDER, FOR SOLUTION INTRAMUSCULAR; INTRAVENOUS ONCE
Status: DISCONTINUED | OUTPATIENT
Start: 2024-01-21 | End: 2024-01-21

## 2024-01-21 RX ORDER — POLYETHYLENE GLYCOL 3350 17 G/17G
1 POWDER, FOR SOLUTION ORAL
Status: DISCONTINUED | OUTPATIENT
Start: 2024-01-21 | End: 2024-01-21

## 2024-01-21 RX ORDER — AMLODIPINE BESYLATE 5 MG/1
5 TABLET ORAL DAILY
COMMUNITY

## 2024-01-21 RX ORDER — HEPARIN SODIUM 5000 [USP'U]/ML
5000 INJECTION, SOLUTION INTRAVENOUS; SUBCUTANEOUS EVERY 8 HOURS
Status: DISCONTINUED | OUTPATIENT
Start: 2024-01-23 | End: 2024-01-22

## 2024-01-21 RX ORDER — PROMETHAZINE HYDROCHLORIDE 25 MG/1
12.5-25 SUPPOSITORY RECTAL EVERY 4 HOURS PRN
Status: DISCONTINUED | OUTPATIENT
Start: 2024-01-21 | End: 2024-01-24 | Stop reason: HOSPADM

## 2024-01-21 RX ORDER — SODIUM CHLORIDE, SODIUM LACTATE, POTASSIUM CHLORIDE, AND CALCIUM CHLORIDE .6; .31; .03; .02 G/100ML; G/100ML; G/100ML; G/100ML
30 INJECTION, SOLUTION INTRAVENOUS ONCE
Status: COMPLETED | OUTPATIENT
Start: 2024-01-21 | End: 2024-01-21

## 2024-01-21 RX ORDER — PROMETHAZINE HYDROCHLORIDE 25 MG/1
12.5-25 TABLET ORAL EVERY 4 HOURS PRN
Status: DISCONTINUED | OUTPATIENT
Start: 2024-01-21 | End: 2024-01-24 | Stop reason: HOSPADM

## 2024-01-21 RX ORDER — PANTOPRAZOLE SODIUM 40 MG/10ML
80 INJECTION, POWDER, LYOPHILIZED, FOR SOLUTION INTRAVENOUS ONCE
Status: COMPLETED | OUTPATIENT
Start: 2024-01-21 | End: 2024-01-21

## 2024-01-21 RX ORDER — METRONIDAZOLE 500 MG/100ML
500 INJECTION, SOLUTION INTRAVENOUS EVERY 8 HOURS
Status: DISCONTINUED | OUTPATIENT
Start: 2024-01-21 | End: 2024-01-22

## 2024-01-21 RX ORDER — POTASSIUM CHLORIDE 1500 MG/1
40 TABLET, EXTENDED RELEASE ORAL ONCE
Status: COMPLETED | OUTPATIENT
Start: 2024-01-21 | End: 2024-01-21

## 2024-01-21 RX ORDER — IBUPROFEN 200 MG
600 TABLET ORAL EVERY 6 HOURS PRN
COMMUNITY

## 2024-01-21 RX ORDER — AMOXICILLIN 250 MG
2 CAPSULE ORAL 2 TIMES DAILY
Status: DISCONTINUED | OUTPATIENT
Start: 2024-01-21 | End: 2024-01-21

## 2024-01-21 RX ORDER — SODIUM CHLORIDE, SODIUM LACTATE, POTASSIUM CHLORIDE, AND CALCIUM CHLORIDE .6; .31; .03; .02 G/100ML; G/100ML; G/100ML; G/100ML
30 INJECTION, SOLUTION INTRAVENOUS
Status: DISCONTINUED | OUTPATIENT
Start: 2024-01-21 | End: 2024-01-21

## 2024-01-21 RX ADMIN — VANCOMYCIN HYDROCHLORIDE 125 MG: 5 INJECTION, POWDER, LYOPHILIZED, FOR SOLUTION INTRAVENOUS at 17:45

## 2024-01-21 RX ADMIN — POTASSIUM CHLORIDE 40 MEQ: 1500 TABLET, EXTENDED RELEASE ORAL at 16:31

## 2024-01-21 RX ADMIN — SODIUM CHLORIDE 1000 ML: 9 INJECTION, SOLUTION INTRAVENOUS at 21:00

## 2024-01-21 RX ADMIN — SODIUM CHLORIDE, POTASSIUM CHLORIDE, SODIUM LACTATE AND CALCIUM CHLORIDE 1914 ML: 600; 310; 30; 20 INJECTION, SOLUTION INTRAVENOUS at 11:36

## 2024-01-21 RX ADMIN — VANCOMYCIN HYDROCHLORIDE 125 MG: 5 INJECTION, POWDER, LYOPHILIZED, FOR SOLUTION INTRAVENOUS at 23:56

## 2024-01-21 RX ADMIN — METRONIDAZOLE 500 MG: 5 INJECTION, SOLUTION INTRAVENOUS at 20:17

## 2024-01-21 RX ADMIN — VANCOMYCIN HYDROCHLORIDE 125 MG: 5 INJECTION, POWDER, LYOPHILIZED, FOR SOLUTION INTRAVENOUS at 12:52

## 2024-01-21 RX ADMIN — ACETAMINOPHEN 650 MG: 325 TABLET ORAL at 11:55

## 2024-01-21 RX ADMIN — PANTOPRAZOLE SODIUM 80 MG: 40 INJECTION, POWDER, FOR SOLUTION INTRAVENOUS at 12:43

## 2024-01-21 RX ADMIN — METRONIDAZOLE 500 MG: 5 INJECTION, SOLUTION INTRAVENOUS at 12:44

## 2024-01-21 RX ADMIN — ACETAMINOPHEN 650 MG: 325 TABLET ORAL at 20:26

## 2024-01-21 RX ADMIN — AMLODIPINE BESYLATE 5 MG: 5 TABLET ORAL at 16:31

## 2024-01-21 RX ADMIN — SODIUM CHLORIDE, POTASSIUM CHLORIDE, SODIUM LACTATE AND CALCIUM CHLORIDE: 600; 310; 30; 20 INJECTION, SOLUTION INTRAVENOUS at 15:58

## 2024-01-21 RX ADMIN — OXYCODONE 5 MG: 5 TABLET ORAL at 16:30

## 2024-01-21 RX ADMIN — IOHEXOL 100 ML: 350 INJECTION, SOLUTION INTRAVENOUS at 12:15

## 2024-01-21 RX ADMIN — CEFTRIAXONE SODIUM 2000 MG: 2 INJECTION, POWDER, FOR SOLUTION INTRAMUSCULAR; INTRAVENOUS at 21:23

## 2024-01-21 ASSESSMENT — LIFESTYLE VARIABLES
EVER FELT BAD OR GUILTY ABOUT YOUR DRINKING: NO
AVERAGE NUMBER OF DAYS PER WEEK YOU HAVE A DRINK CONTAINING ALCOHOL: 2
HAVE YOU EVER FELT YOU SHOULD CUT DOWN ON YOUR DRINKING: NO
TOTAL SCORE: 0
HAVE PEOPLE ANNOYED YOU BY CRITICIZING YOUR DRINKING: NO
CONSUMPTION TOTAL: NEGATIVE
ALCOHOL_USE: YES
EVER HAD A DRINK FIRST THING IN THE MORNING TO STEADY YOUR NERVES TO GET RID OF A HANGOVER: NO
ON A TYPICAL DAY WHEN YOU DRINK ALCOHOL HOW MANY DRINKS DO YOU HAVE: 3
TOTAL SCORE: 0
HOW MANY TIMES IN THE PAST YEAR HAVE YOU HAD 5 OR MORE DRINKS IN A DAY: 0
TOTAL SCORE: 0

## 2024-01-21 ASSESSMENT — FIBROSIS 4 INDEX
FIB4 SCORE: 0.5
FIB4 SCORE: 0.5
FIB4 SCORE: 0.45

## 2024-01-21 ASSESSMENT — COGNITIVE AND FUNCTIONAL STATUS - GENERAL
SUGGESTED CMS G CODE MODIFIER DAILY ACTIVITY: CH
SUGGESTED CMS G CODE MODIFIER MOBILITY: CH
DAILY ACTIVITIY SCORE: 24
MOBILITY SCORE: 24

## 2024-01-21 ASSESSMENT — PATIENT HEALTH QUESTIONNAIRE - PHQ9
1. LITTLE INTEREST OR PLEASURE IN DOING THINGS: NOT AT ALL
SUM OF ALL RESPONSES TO PHQ9 QUESTIONS 1 AND 2: 0
2. FEELING DOWN, DEPRESSED, IRRITABLE, OR HOPELESS: NOT AT ALL

## 2024-01-21 ASSESSMENT — ENCOUNTER SYMPTOMS
NAUSEA: 1
SHORTNESS OF BREATH: 0
VOMITING: 0
FLANK PAIN: 0
DIARRHEA: 1
FEVER: 1
COUGH: 0
BLOOD IN STOOL: 1
NERVOUS/ANXIOUS: 0
MYALGIAS: 0
EYE REDNESS: 0
EYE DISCHARGE: 0
ABDOMINAL PAIN: 1
BRUISES/BLEEDS EASILY: 0
FOCAL WEAKNESS: 0
STRIDOR: 0
CHILLS: 1

## 2024-01-21 ASSESSMENT — PAIN DESCRIPTION - PAIN TYPE
TYPE: ACUTE PAIN

## 2024-01-21 NOTE — ASSESSMENT & PLAN NOTE
Cardiac diet when patient can take orally   I will hold home statin for now given his current GI symptoms

## 2024-01-21 NOTE — ED PROVIDER NOTES
"ED PHYSICIAN NOTE    CHIEF COMPLAINT  Chief Complaint   Patient presents with    Abdominal Pain     Mid to LLQ since Fri Cramping    Bloody Stools     Black stools since Fri but BM x 3 with blood more recently  Pt is also on pepto-bismal    Dizziness     When standing     HPI/ROS  LIMITATION TO HISTORY   Select: Language Thai,  Used       NatanaelJerson Hidalgo is a 43 y.o. male who presents with abdominal pain.  Abdominal pain started 2 days ago.  Cramping mid epigastrium and left side of the abdomen.  Associated diarrhea.  Stools were black but now there is some blood in them.  He has been taking Pepto-Bismol.  He describes watery stool with a red tent.  He started to feel dizzy lightheaded when he stands up.  Unsure of when fever started.  Denies cough congestion runny nose.  He no dysuria.  No rash.  He is never anything like this before    No recent antibiotics.  No travel out of the country.  No known ill exposure.  2 cats at home.  No abnormal foods.    Patient has been taking Pepto-Bismol and ibuprofen for his fever abdominal pain and diarrhea    PAST MEDICAL HISTORY  Past Medical History:   Diagnosis Date    Hypercholesterolemia     Hypertension        SOCIAL HISTORY  Social History     Tobacco Use    Smoking status: Never    Smokeless tobacco: Never   Vaping Use    Vaping Use: Never used   Substance Use Topics    Alcohol use: Yes     Comment: Occassional    Drug use: No       CURRENT MEDICATIONS  Home Medications    **Home medications have not yet been reviewed for this encounter**         ALLERGIES  No Known Allergies    PHYSICAL EXAM  VITAL SIGNS: BP (!) 156/97   Pulse (!) 133   Temp (!) 38.3 °C (101 °F) (Temporal)   Resp 16   Ht 1.676 m (5' 6\")   Wt 86 kg (189 lb 9.5 oz)   SpO2 98%   BMI 30.60 kg/m²    Constitutional: Awake and alert  HENT: Normal inspection  Eyes: Normal inspection  Neck: Grossly normal range of motion.  Cardiovascular: Elevated heart rate, Normal rhythm.  " Symmetric peripheral pulses.   Thorax & Lungs: No respiratory distress, No wheezing, No rales, No rhonchi, No chest tenderness.   Abdomen: Bowel sounds normal, tender to palpation over the left side of the abdomen.  No rebound or peritonitis  Rectal: Brown liquid stool Hemoccult positive    Skin: No rash.      DIAGNOSTIC STUDIES / PROCEDURES  LABS/EKG  Results for orders placed or performed during the hospital encounter of 01/21/24   Lactic Acid   Result Value Ref Range    Lactic Acid 3.5 (H) 0.5 - 2.0 mmol/L   CBC with Differential   Result Value Ref Range    WBC 12.1 (H) 4.8 - 10.8 K/uL    RBC 5.81 4.70 - 6.10 M/uL    Hemoglobin 17.4 14.0 - 18.0 g/dL    Hematocrit 49.7 42.0 - 52.0 %    MCV 85.5 81.4 - 97.8 fL    MCH 29.9 27.0 - 33.0 pg    MCHC 35.0 32.3 - 36.5 g/dL    RDW 37.0 35.9 - 50.0 fL    Platelet Count 218 164 - 446 K/uL    MPV 10.0 9.0 - 12.9 fL    Neutrophils-Polys 76.80 (H) 44.00 - 72.00 %    Lymphocytes 10.30 (L) 22.00 - 41.00 %    Monocytes 11.10 0.00 - 13.40 %    Eosinophils 0.80 0.00 - 6.90 %    Basophils 0.70 0.00 - 1.80 %    Immature Granulocytes 0.30 0.00 - 0.90 %    Nucleated RBC 0.00 0.00 - 0.20 /100 WBC    Neutrophils (Absolute) 9.27 (H) 1.82 - 7.42 K/uL    Lymphs (Absolute) 1.24 1.00 - 4.80 K/uL    Monos (Absolute) 1.34 (H) 0.00 - 0.85 K/uL    Eos (Absolute) 0.10 0.00 - 0.51 K/uL    Baso (Absolute) 0.08 0.00 - 0.12 K/uL    Immature Granulocytes (abs) 0.04 0.00 - 0.11 K/uL    NRBC (Absolute) 0.00 K/uL   Complete Metabolic Panel   Result Value Ref Range    Sodium 133 (L) 135 - 145 mmol/L    Potassium 3.5 (L) 3.6 - 5.5 mmol/L    Chloride 98 96 - 112 mmol/L    Co2 17 (L) 20 - 33 mmol/L    Anion Gap 18.0 (H) 7.0 - 16.0    Glucose 171 (H) 65 - 99 mg/dL    Bun 10 8 - 22 mg/dL    Creatinine 0.88 0.50 - 1.40 mg/dL    Calcium 9.5 8.4 - 10.2 mg/dL    Correct Calcium 9.0 8.5 - 10.5 mg/dL    AST(SGOT) 12 12 - 45 U/L    ALT(SGPT) 20 2 - 50 U/L    Alkaline Phosphatase 88 30 - 99 U/L    Total Bilirubin 1.0  0.1 - 1.5 mg/dL    Albumin 4.6 3.2 - 4.9 g/dL    Total Protein 7.8 6.0 - 8.2 g/dL    Globulin 3.2 1.9 - 3.5 g/dL    A-G Ratio 1.4 g/dL   Prothrombin Time   Result Value Ref Range    PT 15.8 (H) 12.0 - 14.6 sec    INR 1.21 (H) 0.87 - 1.13   APTT   Result Value Ref Range    APTT 35.2 24.7 - 36.0 sec   CoV-2, Flu A/B, And RSV by PCR ([facility])    Specimen: Respirate   Result Value Ref Range    SARS-CoV-2 Source NP Swab    ESTIMATED GFR   Result Value Ref Range    GFR (CKD-EPI) 110 >60 mL/min/1.73 m 2          RADIOLOGY  I have independently interpreted the diagnostic imaging associated with this visit and am waiting the final reading from the radiologist.   My preliminary interpretation is as follows: CT abdomen without diverticulitis  Radiologist interpretation:   DX-CHEST-PORTABLE (1 VIEW)   Final Result      1.  There is no acute cardiopulmonary process.      CT-ABDOMEN-PELVIS WITH    (Results Pending)         COURSE & MEDICAL DECISION MAKING    INITIAL ASSESSMENT, COURSE AND PLAN  Care Narrative: Patient presents with abdominal pain, diarrhea and bloody stool.  He is febrile.  He appears unwell.  High risk complaints requiring consideration of multiple life-threatening problems.  Sepsis protocol was initiated.  Give IV fluid bolus.  Will give antipyretic Tylenol.  Obtain diagnostic imaging and laboratory data.    Rectal exam performed Hemoccult positive brown stool.    Laboratory data returns with metabolic acidosis.  No remarkable anemia.  He does have leukocytosis.  Mildly elevated INR unclear cause and significance.    CT abdomen demonstrates colitis.  Initially had plans to give ceftriaxone and Flagyl for intra-abdominal infection given colitis and concern for C. difficile patient only received Flagyl.  Will give oral vancomycin.  Continue with IV hydration.  He remains tachycardic.  He will need to be admitted to hospital.    Discussed case with Dr. Caceres      ADDITIONAL PROBLEM LIST  Past Medical History:    Diagnosis Date    Hypercholesterolemia     Hypertension        DISPOSITION AND DISCUSSIONS  I have discussed management of the patient with the following physicians and MARGAUX's: As noted above    Discussion of management with other QHP or appropriate source(s): Pharmacy          FINAL IMPRESSION  1.  Sepsis  2.  Hemorrhagic colitis    CRITICAL CARE  The very real possibilty of a deterioration of this patient's condition required the highest level of my preparedness for sudden, emergent intervention.  I provided critical care services, which included medication orders, frequent reevaluations of the patient's condition and response to treatment, ordering and reviewing test results, and discussing the case with various consultants.  The critical care time associated with the care of the patient was 40 minutes. Review chart for interventions. This time is exclusive of any other billable procedures.       This dictation was created using voice recognition software. The accuracy of the dictation is limited to the abilities of the software. I expect there may be some errors of grammar and possibly content. The nursing notes were reviewed and certain aspects of this information were incorporated into this note.    Electronically signed by: Nakul Arrieta M.D., 1/21/2024

## 2024-01-21 NOTE — ED NOTES
Medication history reviewed with pt. Med rec is complete.  Allergies reviewed, per pt    Pt reports that he has not taken his MELOXICAM 15MG or NORCO 5-325MG in the last 30 days or longer.    Patient has not had any outpatient antibiotics in the last 30 days.    Pt is not on any anticoagulants

## 2024-01-21 NOTE — H&P
Hospital Medicine History & Physical Note    Date of Service  1/21/2024    Primary Care Physician  Mohsen Tamasaby, M.D.    Consultants  None      Code Status  Full Code    Chief Complaint  Chief Complaint   Patient presents with    Abdominal Pain     Mid to LLQ since Fri Cramping    Bloody Stools     Black stools since Fri but BM x 3 with blood more recently  Pt is also on pepto-bismal    Dizziness     When standing     History of Presenting Illness  Rex Hidalgo is a 43 y.o. male with a past medical history of primary hypertension and hyperlipemia who presented 1/21/2024 with abdominal pain, generalized weakness, alcohol, bloody diarrhea.  Patient reports that has not been feeling well over the past 2 days.  He has been having progressively worsening cramping abdominal pain.  He has been having bloody diarrhea.  He denies using antibiotics however he did have surgery on his right elbow about a month ago.     I discussed the plan of care with emergency department physician, the patient and patient daughter present at bedside in the emergency room.    Review of Systems  Review of Systems   Constitutional:  Positive for chills, fever and malaise/fatigue.   Eyes:  Negative for discharge and redness.   Respiratory:  Negative for cough, shortness of breath and stridor.    Cardiovascular:  Negative for chest pain and leg swelling.   Gastrointestinal:  Positive for abdominal pain, blood in stool, diarrhea and nausea. Negative for vomiting.   Genitourinary:  Negative for flank pain.   Musculoskeletal:  Negative for myalgias.   Skin: Negative.    Neurological:  Negative for focal weakness.   Endo/Heme/Allergies:  Does not bruise/bleed easily.   Psychiatric/Behavioral:  The patient is not nervous/anxious.      Past Medical History   has a past medical history of Hypercholesterolemia and Hypertension.    Surgical History   has a past surgical history that includes shoulder arthroscopy and elbow arthroscopy  (Right, 2023).     Family History  family history is not on file.      Social History   reports that he has never smoked. He has never used smokeless tobacco. He reports current alcohol use. He reports that he does not use drugs.    Allergies  No Known Allergies    Medications  Prior to Admission Medications   Prescriptions Last Dose Informant Patient Reported? Taking?   amLODIPine (NORVASC) 5 MG Tab 1/19/2024 at AM Patient Yes Yes   Sig: Take 5 mg by mouth every day.   atorvastatin (LIPITOR) 20 MG Tab 1/19/2024 at PM Patient Yes Yes   Sig: Take 20 mg by mouth every evening.   docusate sodium (COLACE) 100 MG Cap 1/21/2024 at 0000 Patient Yes Yes   Sig: Take 100 mg by mouth 2 times a day as needed for Constipation.   ibuprofen (MOTRIN) 200 MG Tab 1/21/2024 at 0200 Patient Yes Yes   Sig: Take 600 mg by mouth every 6 hours as needed. Indications: Pain   polyethylene glycol/lytes (MIRALAX) Pack 1/19/2024 at AM Patient Yes Yes   Sig: Take 17 g by mouth every day.      Facility-Administered Medications: None     Physical Exam  Temp:  [37.7 °C (99.8 °F)-38.7 °C (101.7 °F)] 37.7 °C (99.8 °F)  Pulse:  [105-142] 120  Resp:  [16-22] 18  BP: (125-156)/() 152/75  SpO2:  [94 %-98 %] 97 %  Blood Pressure: (!) 156/97   Temperature: (!) 38.3 °C (101 °F)   Pulse: (!) 133   Respiration: 16   Pulse Oximetry: 98 %     Physical Exam  Constitutional:       General: He is not in acute distress.     Appearance: He is ill-appearing. He is not diaphoretic.   HENT:      Head: Atraumatic.      Right Ear: External ear normal.      Left Ear: External ear normal.      Nose: No congestion or rhinorrhea.      Mouth/Throat:      Mouth: Mucous membranes are dry.   Eyes:      General: No scleral icterus.        Right eye: No discharge.         Left eye: No discharge.      Pupils: Pupils are equal, round, and reactive to light.   Cardiovascular:      Rate and Rhythm: Regular rhythm. Tachycardia present.   Pulmonary:      Effort: Pulmonary effort  "is normal.   Abdominal:      General: There is no distension.      Tenderness: There is abdominal tenderness. There is no rebound.   Musculoskeletal:      Cervical back: Neck supple. No rigidity. No muscular tenderness.      Right lower leg: No edema.      Left lower leg: No edema.   Skin:     General: Skin is dry.      Capillary Refill: Capillary refill takes 2 to 3 seconds.      Coloration: Skin is not jaundiced or pale.   Neurological:      Mental Status: He is alert and oriented to person, place, and time.      Coordination: Coordination normal.   Psychiatric:         Mood and Affect: Mood normal.         Behavior: Behavior normal.       Laboratory:  Recent Labs     01/21/24  1139   WBC 12.1*   RBC 5.81   HEMOGLOBIN 17.4   HEMATOCRIT 49.7   MCV 85.5   MCH 29.9   MCHC 35.0   RDW 37.0   PLATELETCT 218   MPV 10.0     Recent Labs     01/21/24  1139   SODIUM 133*   POTASSIUM 3.5*   CHLORIDE 98   CO2 17*   GLUCOSE 171*   BUN 10   CREATININE 0.88   CALCIUM 9.5     Recent Labs     01/21/24  1139   ALTSGPT 20   ASTSGOT 12   ALKPHOSPHAT 88   TBILIRUBIN 1.0   GLUCOSE 171*     Recent Labs     01/21/24  1139   APTT 35.2   INR 1.21*     No results for input(s): \"NTPROBNP\" in the last 72 hours.      No results for input(s): \"TROPONINT\" in the last 72 hours.    Imaging:  CT-ABDOMEN-PELVIS WITH   Final Result      1.  Diffuse wall thickening of the colon especially the transverse colon, splenic flexure, and the right colon      2.  This finding is consistent with colitis. Different diagnosis includes infectious colitis especially pseudomembranous colitis or inflammatory bowel disease      DX-CHEST-PORTABLE (1 VIEW)   Final Result      1.  There is no acute cardiopulmonary process.        Assessment/Plan:  Justification for Admission Status  I anticipate this patient will require at least two midnights for appropriate medical management, necessitating inpatient admission because the patient has sepsis secondary to severe colitis. "  He will require intravenous fluids and antibiotics.    Patient will need a Telemetry bed on MEDICAL service.  The patient has sepsis secondary to severe colitis.    * Sepsis (HCC)- (present on admission)  Assessment & Plan  This is Sepsis Present on admission  SIRS criteria identified on my evaluation include: Fever, with temperature greater than 100.9 deg F, Tachycardia, with heart rate greater than 90 BPM, Tachypnea, with respirations greater than 20 per minute, and Leukocytosis, with WBC greater than 12,000  Clinical indicators of end organ dysfunction include Lactic Acid greater than 2  Source is colitis   Sepsis protocol initiated  Crystalloid Fluid Administration: Fluid resuscitation ordered per standard protocol - 30 mL/kg per current or ideal body weight  IV antibiotics as appropriate for source of sepsis  Reassessment: I have reassessed the patient's hemodynamic status    Colitis- (present on admission)  Assessment & Plan  CT imaging concerning for pseudomembranous colitis  I will start empirically on metronidazole and oral vancomycin  Rule out C. difficile colitis.  We are checking for C. difficile toxin and stool.  Bowel rest with a modified diet, supportive care with intravenous fluids.  Monitoring and replacing electrolytes as needed.  May need infectious disease consult    Hyperglycemia- (present on admission)  Assessment & Plan  No known history of diabetes   I will check an A1c   Monitor blood sugars with daily labs.  I will order a follow-up glucose level.  Consider sliding scale insulin according to blood sugar trend.    Metabolic acidosis- (present on admission)  Assessment & Plan  Likely due to reduced intravascular volume and increase bicarb losses through diarrhea    I will start intravenous fluids    Anticipate improvement with intravenous fluids  Continue to monitor, I will order follow-up bicarb level        Hypokalemia- (present on admission)  Assessment & Plan  Replacing, checking  magnesium    Continue to monitor replace as needed     Primary hypertension- (present on admission)  Assessment & Plan  Resume amlodipine with hold parameters     Mixed hyperlipidemia- (present on admission)  Assessment & Plan  Cardiac diet when patient can take orally   I will hold home statin for now given his current GI symptoms       VTE prophylaxis: SCDs/TEDs

## 2024-01-22 PROBLEM — E83.42 HYPOMAGNESEMIA: Status: ACTIVE | Noted: 2024-01-22

## 2024-01-22 PROBLEM — K92.2 GI BLEEDING: Status: ACTIVE | Noted: 2024-01-22

## 2024-01-22 LAB
ALBUMIN SERPL BCP-MCNC: 3.8 G/DL (ref 3.2–4.9)
ALBUMIN/GLOB SERPL: 1.7 G/DL
ALP SERPL-CCNC: 62 U/L (ref 30–99)
ALT SERPL-CCNC: 16 U/L (ref 2–50)
ANION GAP SERPL CALC-SCNC: 10 MMOL/L (ref 7–16)
AST SERPL-CCNC: 11 U/L (ref 12–45)
BILIRUB SERPL-MCNC: 0.7 MG/DL (ref 0.1–1.5)
BUN SERPL-MCNC: 7 MG/DL (ref 8–22)
CALCIUM ALBUM COR SERPL-MCNC: 8.7 MG/DL (ref 8.5–10.5)
CALCIUM SERPL-MCNC: 8.5 MG/DL (ref 8.4–10.2)
CHLORIDE SERPL-SCNC: 102 MMOL/L (ref 96–112)
CO2 SERPL-SCNC: 23 MMOL/L (ref 20–33)
CREAT SERPL-MCNC: 0.66 MG/DL (ref 0.5–1.4)
E COLI SXT1+2 STL IA: NORMAL
ERYTHROCYTE [DISTWIDTH] IN BLOOD BY AUTOMATED COUNT: 38.4 FL (ref 35.9–50)
GFR SERPLBLD CREATININE-BSD FMLA CKD-EPI: 120 ML/MIN/1.73 M 2
GLOBULIN SER CALC-MCNC: 2.2 G/DL (ref 1.9–3.5)
GLUCOSE SERPL-MCNC: 127 MG/DL (ref 65–99)
HCT VFR BLD AUTO: 40.2 % (ref 42–52)
HCT VFR BLD AUTO: 40.7 % (ref 42–52)
HCT VFR BLD AUTO: 40.9 % (ref 42–52)
HGB BLD-MCNC: 13.7 G/DL (ref 14–18)
HGB BLD-MCNC: 14.1 G/DL (ref 14–18)
HGB BLD-MCNC: 14.2 G/DL (ref 14–18)
LACTATE SERPL-SCNC: 1.3 MMOL/L (ref 0.5–2)
MAGNESIUM SERPL-MCNC: 1.9 MG/DL (ref 1.5–2.5)
MCH RBC QN AUTO: 29.7 PG (ref 27–33)
MCHC RBC AUTO-ENTMCNC: 34.1 G/DL (ref 32.3–36.5)
MCV RBC AUTO: 87 FL (ref 81.4–97.8)
PLATELET # BLD AUTO: 168 K/UL (ref 164–446)
PMV BLD AUTO: 10.3 FL (ref 9–12.9)
POTASSIUM SERPL-SCNC: 3.5 MMOL/L (ref 3.6–5.5)
PROT SERPL-MCNC: 6 G/DL (ref 6–8.2)
RBC # BLD AUTO: 4.62 M/UL (ref 4.7–6.1)
SIGNIFICANT IND 70042: NORMAL
SITE SITE: NORMAL
SODIUM SERPL-SCNC: 135 MMOL/L (ref 135–145)
SOURCE SOURCE: NORMAL
WBC # BLD AUTO: 7.5 K/UL (ref 4.8–10.8)

## 2024-01-22 PROCEDURE — 85014 HEMATOCRIT: CPT | Mod: 91

## 2024-01-22 PROCEDURE — 94760 N-INVAS EAR/PLS OXIMETRY 1: CPT

## 2024-01-22 PROCEDURE — 83735 ASSAY OF MAGNESIUM: CPT

## 2024-01-22 PROCEDURE — 700111 HCHG RX REV CODE 636 W/ 250 OVERRIDE (IP): Performed by: HOSPITALIST

## 2024-01-22 PROCEDURE — A9270 NON-COVERED ITEM OR SERVICE: HCPCS | Performed by: INTERNAL MEDICINE

## 2024-01-22 PROCEDURE — 770020 HCHG ROOM/CARE - TELE (206)

## 2024-01-22 PROCEDURE — 85027 COMPLETE CBC AUTOMATED: CPT

## 2024-01-22 PROCEDURE — 99233 SBSQ HOSP IP/OBS HIGH 50: CPT | Performed by: INTERNAL MEDICINE

## 2024-01-22 PROCEDURE — 700105 HCHG RX REV CODE 258: Performed by: HOSPITALIST

## 2024-01-22 PROCEDURE — C9113 INJ PANTOPRAZOLE SODIUM, VIA: HCPCS | Performed by: INTERNAL MEDICINE

## 2024-01-22 PROCEDURE — 83993 ASSAY FOR CALPROTECTIN FECAL: CPT

## 2024-01-22 PROCEDURE — 700111 HCHG RX REV CODE 636 W/ 250 OVERRIDE (IP): Performed by: INTERNAL MEDICINE

## 2024-01-22 PROCEDURE — 700105 HCHG RX REV CODE 258: Performed by: INTERNAL MEDICINE

## 2024-01-22 PROCEDURE — 36415 COLL VENOUS BLD VENIPUNCTURE: CPT

## 2024-01-22 PROCEDURE — 80053 COMPREHEN METABOLIC PANEL: CPT

## 2024-01-22 PROCEDURE — 83605 ASSAY OF LACTIC ACID: CPT

## 2024-01-22 PROCEDURE — 700102 HCHG RX REV CODE 250 W/ 637 OVERRIDE(OP): Performed by: INTERNAL MEDICINE

## 2024-01-22 PROCEDURE — 85018 HEMOGLOBIN: CPT | Mod: 91

## 2024-01-22 RX ORDER — MAGNESIUM SULFATE 1 G/100ML
1 INJECTION INTRAVENOUS ONCE
Status: COMPLETED | OUTPATIENT
Start: 2024-01-22 | End: 2024-01-22

## 2024-01-22 RX ORDER — POTASSIUM CHLORIDE 1500 MG/1
40 TABLET, EXTENDED RELEASE ORAL ONCE
Status: COMPLETED | OUTPATIENT
Start: 2024-01-22 | End: 2024-01-22

## 2024-01-22 RX ORDER — METRONIDAZOLE 500 MG/1
500 TABLET ORAL EVERY 12 HOURS
Status: DISCONTINUED | OUTPATIENT
Start: 2024-01-22 | End: 2024-01-22

## 2024-01-22 RX ORDER — SODIUM CHLORIDE 9 MG/ML
INJECTION, SOLUTION INTRAVENOUS CONTINUOUS
Status: DISCONTINUED | OUTPATIENT
Start: 2024-01-22 | End: 2024-01-24 | Stop reason: HOSPADM

## 2024-01-22 RX ORDER — PANTOPRAZOLE SODIUM 40 MG/10ML
40 INJECTION, POWDER, LYOPHILIZED, FOR SOLUTION INTRAVENOUS 2 TIMES DAILY
Status: DISCONTINUED | OUTPATIENT
Start: 2024-01-22 | End: 2024-01-23

## 2024-01-22 RX ORDER — AZITHROMYCIN 250 MG/1
500 TABLET, FILM COATED ORAL DAILY
Status: COMPLETED | OUTPATIENT
Start: 2024-01-22 | End: 2024-01-24

## 2024-01-22 RX ADMIN — PANTOPRAZOLE SODIUM 40 MG: 40 INJECTION, POWDER, FOR SOLUTION INTRAVENOUS at 17:34

## 2024-01-22 RX ADMIN — SODIUM CHLORIDE: 9 INJECTION, SOLUTION INTRAVENOUS at 22:18

## 2024-01-22 RX ADMIN — AZITHROMYCIN DIHYDRATE 500 MG: 250 TABLET ORAL at 11:30

## 2024-01-22 RX ADMIN — PANTOPRAZOLE SODIUM 40 MG: 40 INJECTION, POWDER, FOR SOLUTION INTRAVENOUS at 09:15

## 2024-01-22 RX ADMIN — SODIUM CHLORIDE, POTASSIUM CHLORIDE, SODIUM LACTATE AND CALCIUM CHLORIDE: 600; 310; 30; 20 INJECTION, SOLUTION INTRAVENOUS at 03:31

## 2024-01-22 RX ADMIN — METRONIDAZOLE 500 MG: 5 INJECTION, SOLUTION INTRAVENOUS at 06:22

## 2024-01-22 RX ADMIN — SODIUM CHLORIDE: 9 INJECTION, SOLUTION INTRAVENOUS at 11:28

## 2024-01-22 RX ADMIN — POTASSIUM CHLORIDE 40 MEQ: 1500 TABLET, EXTENDED RELEASE ORAL at 05:24

## 2024-01-22 RX ADMIN — MAGNESIUM SULFATE IN DEXTROSE 1 G: 10 INJECTION, SOLUTION INTRAVENOUS at 05:24

## 2024-01-22 ASSESSMENT — ENCOUNTER SYMPTOMS
BLOOD IN STOOL: 1
BACK PAIN: 0
BLURRED VISION: 0
COUGH: 0
NEUROLOGICAL NEGATIVE: 1
SHORTNESS OF BREATH: 0
DIZZINESS: 0
DOUBLE VISION: 0
RESPIRATORY NEGATIVE: 1
NAUSEA: 1
EYES NEGATIVE: 1
DIARRHEA: 1
PSYCHIATRIC NEGATIVE: 1
MUSCULOSKELETAL NEGATIVE: 1
FEVER: 0
HEARTBURN: 0
CARDIOVASCULAR NEGATIVE: 1
HEADACHES: 0
FALLS: 0
CHILLS: 0
ABDOMINAL PAIN: 1
PALPITATIONS: 0
NERVOUS/ANXIOUS: 0

## 2024-01-22 ASSESSMENT — PAIN DESCRIPTION - PAIN TYPE
TYPE: ACUTE PAIN
TYPE: ACUTE PAIN

## 2024-01-22 ASSESSMENT — LIFESTYLE VARIABLES: SUBSTANCE_ABUSE: 0

## 2024-01-22 NOTE — PROGRESS NOTES
Dr. Caceres notified of elevated lactic acid and low urine output. Orders for bolus and antibiotic placed by physician.

## 2024-01-22 NOTE — ASSESSMENT & PLAN NOTE
Black tarry stools secondary to Pepto-Bismol likely, stable H&H  Infectious colitis suspected  Completed azithromycin

## 2024-01-22 NOTE — PROGRESS NOTES
Telemetry Strip     Strip printed: 1554  Measurements from am strip were as follows:  Rhythm: ST  HR: 120s  Measurements: 0.14/ 0.10/ 0.34  Ectopy: none              Normal Values  Rhythm SR  HR Range    Measurements 0.12-0.20 / 0.06-0.10  / 0.30-0.52

## 2024-01-22 NOTE — CONSULTS
Gastroenterology Initial Consult Note               Author:  ASHELY Raymond Date & Time Created: 1/22/2024 12:04 PM       Patient ID:  Name:             Rex Rosenberg  YOB: 1980  Age:                 43 y.o.  male  MRN:               [id number]      Referring Provider:  Sekou Salazar MD      Presenting Chief Complaint:  Diarrhea, Abdominal pain, Rectal bleeding      History of Present Illness:    This is a very pleasant 43 y.o. male with the past medical history that includes hypertension hyperlipidemia who is seen in consultation for diarrhea and abdominal pain.  The patient states that he had acute onset initially abdominal pain followed by diarrhea and fever that started 3 days ago.  He states he took some Pepto-Bismol due to the pain and diarrhea and his stools were dark later that night.  Symptoms persisted through Sunday with up to 20 loose bowel movements, worse with eating.  He states he had 3 episodes of very small amount of bright red blood in the toilet as well as on the toilet tissue.  He has not had any rectal bleeding since arrival to the hospital.  He states his diarrhea is about the same, but abdominal pain is improved.  The pain is currently mild to moderate and located in the left lower quadrant.  No current fever or chills.  He denies recent travel outside the country or potentially consuming contaminated water.  His family member just brought back some cheese from [place] which he had consumed recently.      Initial labs with WBC 12.1, hemoglobin 17.4, hematocrit 49.7.  CMP sodium 133, potassium 3.5, anion gap 18, lactic acid 3.5.  C. difficile negative.  COVID, RSV, influenza negative.  CT abdomen pelvis with contrast demonstrated diffuse wall thickening of the colon especially transverse, splenic flexure, right colon.    Review of Systems:  Review of Systems   Constitutional:  Positive for malaise/fatigue. Negative for chills and fever.   HENT: Negative.      Eyes: Negative.    Respiratory: Negative.     Cardiovascular: Negative.    Gastrointestinal:  Positive for abdominal pain, blood in stool and diarrhea.   Genitourinary: Negative.    Musculoskeletal: Negative.    Skin: Negative.    Neurological: Negative.    Psychiatric/Behavioral: Negative.               Past Medical History:  Past Medical History:   Diagnosis Date    Hypercholesterolemia     Hypertension      Active Hospital Problems    Diagnosis     Hypomagnesemia [E83.42]     GI bleeding [K92.2]     Sepsis (HCC) [A41.9]     Primary hypertension [I10]     Mixed hyperlipidemia [E78.2]     Hypokalemia [E87.6]     Metabolic acidosis [E87.20]     Hyperglycemia [R73.9]     Colitis [K52.9]          Past Surgical History:  Past Surgical History:   Procedure Laterality Date    ELBOW ARTHROSCOPY Right 2023    SHOULDER ARTHROSCOPY             Hospital Medications:  Current Facility-Administered Medications   Medication Dose Frequency Provider Last Rate Last Admin    pantoprazole (Protonix) injection 40 mg  40 mg BID Sekou Alonso M.D.   40 mg at 01/22/24 0915    NS infusion   Continuous Sekou Alonso M.D. 83 mL/hr at 01/22/24 1128 New Bag at 01/22/24 1128    azithromycin (Zithromax) tablet 500 mg  500 mg DAILY Sekou Alonso M.D.   500 mg at 01/22/24 1130    amLODIPine (Norvasc) tablet 5 mg  5 mg DAILY Asem ESTER Caceres M.D.   5 mg at 01/21/24 1631    acetaminophen (Tylenol) tablet 650 mg  650 mg Q6HRS PRN Asem ESTER Caceres M.D.   650 mg at 01/21/24 2026    LR (Bolus) infusion 500 mL  500 mL Once PRN Asem ESTER Caceres M.D.        ondansetron (Zofran) syringe/vial injection 4 mg  4 mg Q4HRS PRN Asemiguel Caceres M.D.        ondansetron (Zofran ODT) dispertab 4 mg  4 mg Q4HRS PRN Asemiguel Caceres M.D.        promethazine (Phenergan) tablet 12.5-25 mg  12.5-25 mg Q4HRS PRN Asemiguel Caceres M.D.        promethazine (Phenergan) suppository 12.5-25 mg  12.5-25 mg Q4HRS PRN Terrance Caceres M.D.         prochlorperazine (Compazine) injection 5-10 mg  5-10 mg Q4HRS PRN Terrance Caceres M.D.        Pharmacy Consult Request ...Pain Management Review 1 Each  1 Each PHARMACY TO DOSE Terrance Caceres M.D.        oxyCODONE immediate-release (Roxicodone) tablet 2.5 mg  2.5 mg Q3HRS PRN Terrance Caceres M.D.        Or    oxyCODONE immediate-release (Roxicodone) tablet 5 mg  5 mg Q3HRS PRN Terrance Caceres M.D.   5 mg at 01/21/24 1630    Or    HYDROmorphone (Dilaudid) injection 0.5 mg  0.5 mg Q3HRS PRN Terrance Caceres M.D.       Last reviewed on 1/21/2024 12:23 PM by Anuradha Paez       Current Outpatient Medications:  Medications Prior to Admission   Medication Sig Dispense Refill Last Dose    ibuprofen (MOTRIN) 200 MG Tab Take 600 mg by mouth every 6 hours as needed. Indications: Pain   1/21/2024 at 0200    amLODIPine (NORVASC) 5 MG Tab Take 5 mg by mouth every day.   1/19/2024 at AM    atorvastatin (LIPITOR) 20 MG Tab Take 20 mg by mouth every evening.   1/19/2024 at PM    docusate sodium (COLACE) 100 MG Cap Take 100 mg by mouth 2 times a day as needed for Constipation.   1/21/2024 at 0000    polyethylene glycol/lytes (MIRALAX) Pack Take 17 g by mouth every day.   1/19/2024 at AM         Medication Allergies:  No Known Allergies      Family Medical History:  History reviewed. No pertinent family history.      Social History:  Social History     Socioeconomic History    Marital status:      Spouse name: Not on file    Number of children: Not on file    Years of education: Not on file    Highest education level: Not on file   Occupational History    Not on file   Tobacco Use    Smoking status: Never    Smokeless tobacco: Never   Vaping Use    Vaping Use: Never used   Substance and Sexual Activity    Alcohol use: Yes     Comment: Occassional    Drug use: No    Sexual activity: Not on file   Other Topics Concern    Not on file   Social History Narrative    ** Merged History Encounter **          Social  "Determinants of Health     Financial Resource Strain: Not on file   Food Insecurity: Not on file   Transportation Needs: Not on file   Physical Activity: Not on file   Stress: Not on file   Social Connections: Not on file   Intimate Partner Violence: Not on file   Housing Stability: Not on file         Vital signs:  Weight/BMI: Body mass index is 31.42 kg/m².  /64   Pulse 93   Temp 37.2 °C (98.9 °F)   Resp 18   Ht 1.676 m (5' 6\")   Wt 88.3 kg (194 lb 10.7 oz)   SpO2 94%   Vitals:    01/22/24 0000 01/22/24 0527 01/22/24 0733 01/22/24 0918   BP: 128/79 126/74 125/64    Pulse: 88  93    Resp: 18  18    Temp: 37.1 °C (98.7 °F)  36.9 °C (98.4 °F) 37.2 °C (98.9 °F)   TempSrc: Oral  Oral    SpO2: 95%  94%    Weight:       Height:         Oxygen Therapy:  Pulse Oximetry: 94 %, O2 (LPM): 0, O2 Delivery Device: None - Room Air    Intake/Output Summary (Last 24 hours) at 1/22/2024 1204  Last data filed at 1/22/2024 0919  Gross per 24 hour   Intake 400 ml   Output 1400 ml   Net -1000 ml         Physical Exam:  Physical Exam  Vitals and nursing note reviewed.   Constitutional:       Appearance: Normal appearance.   HENT:      Head: Normocephalic and atraumatic.      Right Ear: External ear normal.      Left Ear: External ear normal.      Nose: Nose normal. No congestion.      Mouth/Throat:      Mouth: Mucous membranes are moist.      Pharynx: Oropharynx is clear.   Eyes:      General: No scleral icterus.     Extraocular Movements: Extraocular movements intact.      Pupils: Pupils are equal, round, and reactive to light.   Cardiovascular:      Rate and Rhythm: Normal rate and regular rhythm.      Pulses: Normal pulses.      Heart sounds: Normal heart sounds. No murmur heard.  Pulmonary:      Effort: Pulmonary effort is normal.      Breath sounds: Normal breath sounds.   Abdominal:      General: Abdomen is flat. Bowel sounds are normal.      Palpations: Abdomen is soft.      Tenderness: There is abdominal tenderness " (LLQ).   Musculoskeletal:      Cervical back: Neck supple.      Right lower leg: No edema.      Left lower leg: No edema.   Lymphadenopathy:      Cervical: No cervical adenopathy.   Skin:     General: Skin is warm and dry.      Capillary Refill: Capillary refill takes less than 2 seconds.   Neurological:      General: No focal deficit present.      Mental Status: He is alert and oriented to person, place, and time.   Psychiatric:         Thought Content: Thought content normal.         Judgment: Judgment normal.           Labs:  Recent Labs     01/21/24 1139 01/22/24  0331   SODIUM 133* 135   POTASSIUM 3.5* 3.5*   CHLORIDE 98 102   CO2 17* 23   BUN 10 7*   CREATININE 0.88 0.66   MAGNESIUM  --  1.9   CALCIUM 9.5 8.5     Recent Labs     01/21/24  1139 01/22/24  0331   ALTSGPT 20 16   ASTSGOT 12 11*   ALKPHOSPHAT 88 62   TBILIRUBIN 1.0 0.7   GLUCOSE 171* 127*     Recent Labs     01/21/24  1139 01/22/24  0331   WBC 12.1* 7.5   NEUTSPOLYS 76.80*  --    LYMPHOCYTES 10.30*  --    MONOCYTES 11.10  --    EOSINOPHILS 0.80  --    BASOPHILS 0.70  --    ASTSGOT 12 11*   ALTSGPT 20 16   ALKPHOSPHAT 88 62   TBILIRUBIN 1.0 0.7     Recent Labs     01/21/24  1139 01/22/24  0331 01/22/24  0656   RBC 5.81 4.62*  --    HEMOGLOBIN 17.4 13.7* 14.1   HEMATOCRIT 49.7 40.2* 40.9*   PLATELETCT 218 168  --    PROTHROMBTM 15.8*  --   --    APTT 35.2  --   --    INR 1.21*  --   --      Recent Results (from the past 24 hour(s))   C Diff by PCR rflx Toxin    Collection Time: 01/21/24  1:30 PM    Specimen: Stool   Result Value Ref Range    C Diff by PCR Negative Negative    [id number] Presumptive Negative Negative   Lactic Acid    Collection Time: 01/21/24  2:53 PM   Result Value Ref Range    Lactic Acid 2.1 (H) 0.5 - 2.0 mmol/L   Lactic Acid    Collection Time: 01/21/24  7:05 PM   Result Value Ref Range    Lactic Acid 3.0 (H) 0.5 - 2.0 mmol/L   Repeat Lactic Acid    Collection Time: 01/21/24 10:59 PM   Result Value Ref Range    Lactic Acid 1.3  0.5 - 2.0 mmol/L   CBC without Differential    Collection Time: 01/22/24  3:31 AM   Result Value Ref Range    WBC 7.5 4.8 - 10.8 K/uL    RBC 4.62 (L) 4.70 - 6.10 M/uL    Hemoglobin 13.7 (L) 14.0 - 18.0 g/dL    Hematocrit 40.2 (L) 42.0 - 52.0 %    MCV 87.0 81.4 - 97.8 fL    MCH 29.7 27.0 - 33.0 pg    MCHC 34.1 32.3 - 36.5 g/dL    RDW 38.4 35.9 - 50.0 fL    Platelet Count 168 164 - 446 K/uL    MPV 10.3 9.0 - 12.9 fL   Comp Metabolic Panel (CMP)    Collection Time: 01/22/24  3:31 AM   Result Value Ref Range    Sodium 135 135 - 145 mmol/L    Potassium 3.5 (L) 3.6 - 5.5 mmol/L    Chloride 102 96 - 112 mmol/L    Co2 23 20 - 33 mmol/L    Anion Gap 10.0 7.0 - 16.0    Glucose 127 (H) 65 - 99 mg/dL    Bun 7 (L) 8 - 22 mg/dL    Creatinine 0.66 0.50 - 1.40 mg/dL    Calcium 8.5 8.4 - 10.2 mg/dL    Correct Calcium 8.7 8.5 - 10.5 mg/dL    AST(SGOT) 11 (L) 12 - 45 U/L    ALT(SGPT) 16 2 - 50 U/L    Alkaline Phosphatase 62 30 - 99 U/L    Total Bilirubin 0.7 0.1 - 1.5 mg/dL    Albumin 3.8 3.2 - 4.9 g/dL    Total Protein 6.0 6.0 - 8.2 g/dL    Globulin 2.2 1.9 - 3.5 g/dL    A-G Ratio 1.7 g/dL   Magnesium    Collection Time: 01/22/24  3:31 AM   Result Value Ref Range    Magnesium 1.9 1.5 - 2.5 mg/dL   LACTIC ACID    Collection Time: 01/22/24  3:31 AM   Result Value Ref Range    Lactic Acid 1.3 0.5 - 2.0 mmol/L   ESTIMATED GFR    Collection Time: 01/22/24  3:31 AM   Result Value Ref Range    GFR (CKD-EPI) 120 >60 mL/min/1.73 m 2   HEMOGLOBIN AND HEMATOCRIT    Collection Time: 01/22/24  6:56 AM   Result Value Ref Range    Hemoglobin 14.1 14.0 - 18.0 g/dL    Hematocrit 40.9 (L) 42.0 - 52.0 %         Radiology Review:  CT-ABDOMEN-PELVIS WITH   Final Result      1.  Diffuse wall thickening of the colon especially the transverse colon, splenic flexure, and the right colon      2.  This finding is consistent with colitis. Different diagnosis includes infectious colitis especially pseudomembranous colitis or inflammatory bowel disease       DX-CHEST-PORTABLE (1 VIEW)   Final Result      1.  There is no acute cardiopulmonary process.            MDM (Data Review):   -Records reviewed and summarized in current documentation  -I personally reviewed and interpreted the laboratory results  -I personally reviewed the radiology images        Medical Decision Making, by Problem:  Active Hospital Problems    Diagnosis     Hypomagnesemia [E83.42]     GI bleeding [K92.2]     Sepsis (HCC) [A41.9]     Primary hypertension [I10]     Mixed hyperlipidemia [E78.2]     Hypokalemia [E87.6]     Metabolic acidosis [E87.20]     Hyperglycemia [R73.9]     Colitis [K52.9]            Assessment/Recommendations:  43-year-old male with acute onset lower abdominal pain, diarrhea.  He did have 3 episodes of a small amount of hematochezia, likely hemorrhoidal bleeding versus less likely from his colitis.  Given no issues with altered bowels prior to this event, suspect infectious colitis such as viral gastroenteritis or bacterial colitis.  Stool cultures pending.  Patient is on IV antibiotics targeted with azithromycin given his consumption of cheese.  He is not having any further bleeding.  Additional differential does include IBD or malignancy, however these are considered much less likely at this time.    Assessment:  -Lower abdominal pain  -Diarrhea  -Rectal bleeding-likely hemorrhoidal or related to colitis  -Dark stools-suspect secondary to Pepto-Bismol use  -Abnormal imaging of the GI tract    Plan:  -Stool culture, ova and parasites, calprotectin  -Continue antibiotics   -Supportive care.  Imodium as needed  -Hold on colonoscopy at this time as currently do not feel it well .    JOAN Raymond.      Thank you for inviting me to participate in the care of this patient. Please do not hesitate to call GI consultants with additional questions/concerns or changes in the patient's clinical status at [phone].      Core Quality Measures   Reviewed items:   Labs, Medications and Radiology reports reviewed

## 2024-01-22 NOTE — PROGRESS NOTES
Telemetry Shift Summary     Rhythm: SR/ST  HR:   Ectopy: -  Measurements: 0.16/0.10/0.34       Normal Values  Rhythm: SR  HR:   Measurements: 0.12-0.20 / 0.04-0.10 / 0.30-0.52

## 2024-01-22 NOTE — CARE PLAN
The patient is Watcher - Medium risk of patient condition declining or worsening    Shift Goals  Clinical Goals: IV fluids, IV ABX, pain mgt  Patient Goals: comfort, pain relief  Family Goals: updates    Progress made toward(s) clinical / shift goals:    Problem: Pain - Standard  Goal: Alleviation of pain or a reduction in pain to the patient’s comfort goal  Description: Target End Date:  Prior to discharge or change in level of care    Document on Vitals flowsheet    1.  Document pain using the appropriate pain scale per order or unit policy  2.  Educate and implement non-pharmacologic comfort measures (i.e. relaxation, distraction, massage, cold/heat therapy, etc.)  3.  Pain management medications as ordered  4.  Reassess pain after pain med administration per policy  5.  If opiods administered assess patient's response to pain medication is appropriate per POSS sedation scale  6.  Follow pain management plan developed in collaboration with patient and interdisciplinary team (including palliative care or pain specialists if applicable)  Outcome: Progressing  Note: Pt reports relief with current pain medication     Problem: Fluid Volume  Goal: Fluid volume balance will be maintained  Description: Target End Date:  Prior to discharge or change in level of care    Document on I/O flowsheet    1.  Monitor intake and output as ordered  2.  Promote oral intake as appropriate  3.  Report inadequate intake or output to physician  4.  Administer IV therapy as ordered  5.  Weights per provider order  6.  Assess for signs and symptoms of bleeding  7.  Monitor for signs of fluid overload (respiratory changes, edema, weight gain, increased abdominal girth)  8.  Monitor of signs for inadequate fluid volume (poor skin turgor, dry mucous membranes)  9.  Instruct patient on adherence to fluid restrictions  Outcome: Progressing  Note: Pt receiving IV fluids     Problem: Fall Risk  Goal: Patient will remain free from  falls  Description: Target End Date:  Prior to discharge or change in level of care    Document interventions on the Marcia Nguyen Fall Risk Assessment    1.  Assess for fall risk factors  2.  Implement fall precautions  Outcome: Progressing  Note: Bed low and locked, call light within reach       Patient is not progressing towards the following goals:

## 2024-01-22 NOTE — ASSESSMENT & PLAN NOTE
CT imaging concerning for pseudomembranous colitis vs infectious vs inflammatory  Ruled out C. difficile colitis, oral vancomycin has been stopped.  Bowel rest with a modified diet, supportive care with intravenous fluids.  Monitoring and replacing electrolytes as needed.    Infectious colitis, complete azithromycin tomorrow  Appreciate GI recommendations, signed off

## 2024-01-22 NOTE — PROGRESS NOTES
4 Eyes Skin Assessment Completed by SHRADDHA Prado and SHRADDHA Suarez.    Head WDL  Ears WDL  Nose WDL  Mouth WDL  Neck WDL  Breast/Chest WDL  Shoulder Blades WDL  Spine WDL  (R) Arm/Elbow/Hand WDL  (L) Arm/Elbow/Hand WDL  Abdomen WDL  Groin WDL  Scrotum/Coccyx/Buttocks WDL  (R) Leg WDL  (L) Leg WDL  (R) Heel/Foot/Toe WDL  (L) Heel/Foot/Toe WDL          Devices In Places Tele Box      Interventions In Place N/A    Possible Skin Injury No    Pictures Uploaded Into Epic N/A  Wound Consult Placed N/A  RN Wound Prevention Protocol Ordered No

## 2024-01-22 NOTE — CARE PLAN
Problem: Pain - Standard  Goal: Alleviation of pain or a reduction in pain to the patient’s comfort goal  Outcome: Progressing   The patient is Stable - Low risk of patient condition declining or worsening    Shift Goals  Clinical Goals: IV fluids, pain control, stool cultures  Patient Goals: rest  Family Goals: updates    Progress made toward(s) clinical / shift goals:  updated pt on plan of care, pt reports pain is much improved 3/10. Continue IV fluids and IV abx    Patient is not progressing towards the following goals:

## 2024-01-22 NOTE — PROGRESS NOTES
"Hospital Medicine Daily Progress Note    Date of Service  1/22/2024    Chief Complaint  Rex Hidalgo is a 43 y.o. male admitted 1/21/2024 with abdominal pain.    Hospital Course  As per chart review:  \"43 y.o. male with a past medical history of primary hypertension and hyperlipemia who presented 1/21/2024 with abdominal pain, generalized weakness, alcohol, bloody diarrhea.  Patient reports that has not been feeling well over the past 2 days.  He has been having progressively worsening cramping abdominal pain.  He has been having bloody diarrhea.  He denies using antibiotics however he did have surgery on his right elbow about a month ago.\"    Interval Problem Update  1/22: Patient seen at bedside this morning.  Patient lying in bed, he mentions he has seen dark black stools.  We have started the patient on IV Protonix twice daily.  We have consulted GI, we appreciate further recommendations.  We also discussed with ID pharmacy and we have switched to azithromycin.  Will follow stool cultures and blood cultures.  Continue to monitor closely.    I have discussed this patient's plan of care and discharge plan at IDT rounds today with Case Management, Nursing, Nursing leadership, and other members of the IDT team.    Consultants/Specialty  GI    Code Status  Full Code    Disposition  The patient is not medically cleared for discharge to home or a post-acute facility.      I have placed the appropriate orders for post-discharge needs.    Review of Systems  Review of Systems   Constitutional:  Positive for malaise/fatigue. Negative for chills and fever.   HENT:  Negative for hearing loss and nosebleeds.    Eyes:  Negative for blurred vision and double vision.   Respiratory:  Negative for cough and shortness of breath.    Cardiovascular:  Negative for chest pain and palpitations.   Gastrointestinal:  Positive for abdominal pain, blood in stool, diarrhea and nausea. Negative for heartburn. "   Genitourinary:  Negative for dysuria and urgency.   Musculoskeletal:  Negative for back pain and falls.   Skin:  Negative for itching and rash.   Neurological:  Negative for dizziness and headaches.   Psychiatric/Behavioral:  Negative for substance abuse. The patient is not nervous/anxious.    All other systems reviewed and are negative.       Physical Exam  Temp:  [36.9 °C (98.4 °F)-38.7 °C (101.7 °F)] 37.2 °C (98.9 °F)  Pulse:  [] 93  Resp:  [16-22] 18  BP: (125-156)/() 125/64  SpO2:  [92 %-98 %] 94 %    Physical Exam  Vitals and nursing note reviewed.   Constitutional:       Appearance: He is ill-appearing.   HENT:      Head: Normocephalic and atraumatic.      Right Ear: External ear normal.      Left Ear: External ear normal.      Nose: Nose normal.      Mouth/Throat:      Mouth: Mucous membranes are moist.      Pharynx: Oropharynx is clear.   Eyes:      General:         Right eye: No discharge.         Left eye: No discharge.      Extraocular Movements: Extraocular movements intact.      Pupils: Pupils are equal, round, and reactive to light.   Cardiovascular:      Rate and Rhythm: Normal rate and regular rhythm.      Heart sounds: No murmur heard.  Pulmonary:      Effort: Pulmonary effort is normal. No respiratory distress.      Breath sounds: Normal breath sounds.   Abdominal:      Tenderness: There is abdominal tenderness.   Musculoskeletal:      Cervical back: Normal range of motion and neck supple.      Right lower leg: No edema.      Left lower leg: No edema.   Skin:     General: Skin is warm.   Neurological:      General: No focal deficit present.      Mental Status: He is alert and oriented to person, place, and time.   Psychiatric:         Mood and Affect: Mood normal.         Behavior: Behavior normal.         Fluids    Intake/Output Summary (Last 24 hours) at 1/22/2024 1102  Last data filed at 1/22/2024 0919  Gross per 24 hour   Intake 400 ml   Output 1400 ml   Net -1000 ml        Laboratory  Recent Labs     01/21/24  1139 01/22/24  0331 01/22/24  0656   WBC 12.1* 7.5  --    RBC 5.81 4.62*  --    HEMOGLOBIN 17.4 13.7* 14.1   HEMATOCRIT 49.7 40.2* 40.9*   MCV 85.5 87.0  --    MCH 29.9 29.7  --    MCHC 35.0 34.1  --    RDW 37.0 38.4  --    PLATELETCT 218 168  --    MPV 10.0 10.3  --      Recent Labs     01/21/24  1139 01/22/24  0331   SODIUM 133* 135   POTASSIUM 3.5* 3.5*   CHLORIDE 98 102   CO2 17* 23   GLUCOSE 171* 127*   BUN 10 7*   CREATININE 0.88 0.66   CALCIUM 9.5 8.5     Recent Labs     01/21/24  1139   APTT 35.2   INR 1.21*               Imaging  CT-ABDOMEN-PELVIS WITH   Final Result      1.  Diffuse wall thickening of the colon especially the transverse colon, splenic flexure, and the right colon      2.  This finding is consistent with colitis. Different diagnosis includes infectious colitis especially pseudomembranous colitis or inflammatory bowel disease      DX-CHEST-PORTABLE (1 VIEW)   Final Result      1.  There is no acute cardiopulmonary process.           Assessment/Plan  * Colitis- (present on admission)  Assessment & Plan  CT imaging concerning for pseudomembranous colitis vs infectious vs inflammatory  Ruled out C. difficile colitis, oral vancomycin has been stopped.  Bowel rest with a modified diet, supportive care with intravenous fluids.  Monitoring and replacing electrolytes as needed.      1/22: Could be infectious so we will continue with antibiotics. After discussing with ID pharmacy we have switched to azithromycin.  Follow stool cultures and blood cultures.  The patient states that he has seen blood in his stool.  Will monitor hemoglobin.  We have consulted with GI, we appreciate further recommendations.     GI bleeding  Assessment & Plan  Patient states that he has seen black stools at home.  Will start the patient on Protonix IV twice daily.  We have consulted GI, we appreciate further recommendations.    Hypomagnesemia  Assessment & Plan  Replace as  needed  monitor    Hyperglycemia- (present on admission)  Assessment & Plan  No known history of diabetes       1/22: Pending A1c. Continue to monitor glucose.    Metabolic acidosis- (present on admission)  Assessment & Plan  Improved  Continue IVF for now, patient still with diarrhea.    Hypokalemia- (present on admission)  Assessment & Plan  Replace as needed  monitor    Mixed hyperlipidemia- (present on admission)  Assessment & Plan  Cardiac diet when patient can take orally   I will hold home statin for now given his current GI symptoms     Primary hypertension- (present on admission)  Assessment & Plan  continue amlodipine with hold parameters     Sepsis (HCC)- (present on admission)  Assessment & Plan  1/22: Patient does not meet sepsis criteria today.         VTE prophylaxis: SCDs    I have performed a physical exam and reviewed and updated ROS and Plan today (1/22/2024). In review of yesterday's note (1/21/2024), there are no changes except as documented above.      I spend at least 51 minutes providing care for this patient.  This included face-to-face interview, physical examination.  Review of lab work including CBC, CMP, lactic acid.  Review of imaging study including CT scan.  Consulting and discussing with GI.  Discussing with multidisciplinary team including case management, nursing staff and pharmacy.  Creating plan of care, reviewing orders.

## 2024-01-23 LAB
ALBUMIN SERPL BCP-MCNC: 3.7 G/DL (ref 3.2–4.9)
ALBUMIN/GLOB SERPL: 1.3 G/DL
ALP SERPL-CCNC: 64 U/L (ref 30–99)
ALT SERPL-CCNC: 28 U/L (ref 2–50)
ANION GAP SERPL CALC-SCNC: 14 MMOL/L (ref 7–16)
AST SERPL-CCNC: 23 U/L (ref 12–45)
BILIRUB SERPL-MCNC: 0.5 MG/DL (ref 0.1–1.5)
BUN SERPL-MCNC: 8 MG/DL (ref 8–22)
C PARVUM AG STL QL IA.RAPID: NEGATIVE
CALCIUM ALBUM COR SERPL-MCNC: 8.9 MG/DL (ref 8.5–10.5)
CALCIUM SERPL-MCNC: 8.7 MG/DL (ref 8.4–10.2)
CHLORIDE SERPL-SCNC: 105 MMOL/L (ref 96–112)
CO2 SERPL-SCNC: 19 MMOL/L (ref 20–33)
CREAT SERPL-MCNC: 0.66 MG/DL (ref 0.5–1.4)
ERYTHROCYTE [DISTWIDTH] IN BLOOD BY AUTOMATED COUNT: 37.5 FL (ref 35.9–50)
G LAMBLIA AG STL QL IA.RAPID: NEGATIVE
GFR SERPLBLD CREATININE-BSD FMLA CKD-EPI: 120 ML/MIN/1.73 M 2
GLOBULIN SER CALC-MCNC: 2.8 G/DL (ref 1.9–3.5)
GLUCOSE SERPL-MCNC: 98 MG/DL (ref 65–99)
HCT VFR BLD AUTO: 40.9 % (ref 42–52)
HGB BLD-MCNC: 14 G/DL (ref 14–18)
MAGNESIUM SERPL-MCNC: 2.4 MG/DL (ref 1.5–2.5)
MCH RBC QN AUTO: 29.7 PG (ref 27–33)
MCHC RBC AUTO-ENTMCNC: 34.2 G/DL (ref 32.3–36.5)
MCV RBC AUTO: 86.8 FL (ref 81.4–97.8)
PHOSPHATE SERPL-MCNC: 3.7 MG/DL (ref 2.5–4.5)
PLATELET # BLD AUTO: 202 K/UL (ref 164–446)
PMV BLD AUTO: 10.3 FL (ref 9–12.9)
POTASSIUM SERPL-SCNC: 3.8 MMOL/L (ref 3.6–5.5)
PROT SERPL-MCNC: 6.5 G/DL (ref 6–8.2)
RBC # BLD AUTO: 4.71 M/UL (ref 4.7–6.1)
SODIUM SERPL-SCNC: 138 MMOL/L (ref 135–145)
WBC # BLD AUTO: 6.2 K/UL (ref 4.8–10.8)

## 2024-01-23 PROCEDURE — 36415 COLL VENOUS BLD VENIPUNCTURE: CPT

## 2024-01-23 PROCEDURE — 700111 HCHG RX REV CODE 636 W/ 250 OVERRIDE (IP): Performed by: INTERNAL MEDICINE

## 2024-01-23 PROCEDURE — 84100 ASSAY OF PHOSPHORUS: CPT

## 2024-01-23 PROCEDURE — 99232 SBSQ HOSP IP/OBS MODERATE 35: CPT | Performed by: INTERNAL MEDICINE

## 2024-01-23 PROCEDURE — C9113 INJ PANTOPRAZOLE SODIUM, VIA: HCPCS | Performed by: INTERNAL MEDICINE

## 2024-01-23 PROCEDURE — 87328 CRYPTOSPORIDIUM AG IA: CPT

## 2024-01-23 PROCEDURE — A9270 NON-COVERED ITEM OR SERVICE: HCPCS | Performed by: INTERNAL MEDICINE

## 2024-01-23 PROCEDURE — 85027 COMPLETE CBC AUTOMATED: CPT

## 2024-01-23 PROCEDURE — 770001 HCHG ROOM/CARE - MED/SURG/GYN PRIV*

## 2024-01-23 PROCEDURE — 700105 HCHG RX REV CODE 258: Performed by: INTERNAL MEDICINE

## 2024-01-23 PROCEDURE — 87329 GIARDIA AG IA: CPT

## 2024-01-23 PROCEDURE — 94760 N-INVAS EAR/PLS OXIMETRY 1: CPT

## 2024-01-23 PROCEDURE — 83735 ASSAY OF MAGNESIUM: CPT

## 2024-01-23 PROCEDURE — 700102 HCHG RX REV CODE 250 W/ 637 OVERRIDE(OP): Performed by: INTERNAL MEDICINE

## 2024-01-23 PROCEDURE — 80053 COMPREHEN METABOLIC PANEL: CPT

## 2024-01-23 RX ORDER — LOPERAMIDE HYDROCHLORIDE 2 MG/1
2 CAPSULE ORAL 4 TIMES DAILY PRN
Status: DISCONTINUED | OUTPATIENT
Start: 2024-01-23 | End: 2024-01-24 | Stop reason: HOSPADM

## 2024-01-23 RX ADMIN — SODIUM CHLORIDE: 9 INJECTION, SOLUTION INTRAVENOUS at 09:18

## 2024-01-23 RX ADMIN — OMEPRAZOLE 20 MG: 20 CAPSULE, DELAYED RELEASE ORAL at 18:45

## 2024-01-23 RX ADMIN — SODIUM CHLORIDE: 9 INJECTION, SOLUTION INTRAVENOUS at 21:13

## 2024-01-23 RX ADMIN — PANTOPRAZOLE SODIUM 40 MG: 40 INJECTION, POWDER, FOR SOLUTION INTRAVENOUS at 05:17

## 2024-01-23 RX ADMIN — AZITHROMYCIN DIHYDRATE 500 MG: 250 TABLET ORAL at 05:17

## 2024-01-23 RX ADMIN — LOPERAMIDE HYDROCHLORIDE 2 MG: 2 CAPSULE ORAL at 12:48

## 2024-01-23 ASSESSMENT — ENCOUNTER SYMPTOMS
SHORTNESS OF BREATH: 0
NEUROLOGICAL NEGATIVE: 1
VOMITING: 0
CARDIOVASCULAR NEGATIVE: 1
HEADACHES: 0
RESPIRATORY NEGATIVE: 1
MYALGIAS: 0
INSOMNIA: 0
PSYCHIATRIC NEGATIVE: 1
BLURRED VISION: 0
CONSTIPATION: 0
CLAUDICATION: 0
MUSCULOSKELETAL NEGATIVE: 1
DIZZINESS: 0
SENSORY CHANGE: 0
BLOOD IN STOOL: 0
SPEECH CHANGE: 0
CHILLS: 0
DIARRHEA: 1
FEVER: 0
NERVOUS/ANXIOUS: 0
WEAKNESS: 0
ABDOMINAL PAIN: 0
DEPRESSION: 0
COUGH: 0
HEARTBURN: 0
PHOTOPHOBIA: 0

## 2024-01-23 ASSESSMENT — FIBROSIS 4 INDEX: FIB4 SCORE: 0.88

## 2024-01-23 ASSESSMENT — PAIN DESCRIPTION - PAIN TYPE
TYPE: ACUTE PAIN
TYPE: ACUTE PAIN

## 2024-01-23 NOTE — HOSPITAL COURSE
Patient is a 43-year-old male with history of hypertension hyperlipidemia came in on 1/21 with abdominal pain and weakness and bloody diarrhea.  He been feeling poorly over the last 2 days with worsening cramping and abdominal pain.  Diarrhea was reported bloody.  Patient had recent surgery on elbow approximately 1 month ago.  Patient was started empirically on antibiotics and gastroenterology consulted.  Black stools high suspicion and this was related to the Pepto-Bismol that the patient taken.  H&H remained stable and stool studies pending.

## 2024-01-23 NOTE — PROGRESS NOTES
Gastroenterology Progress Note               Author:  ASHELY Raymond Date & Time Created: 1/23/2024 8:48 AM       Patient ID:  Name:             Rex Rosenberg  YOB: 1980  Age:                 43 y.o.  male  MRN:               [id number]      Referring Provider:  Sekou Salazar MD      Presenting Chief Complaint:  Diarrhea, Abdominal pain, Rectal bleeding      History of Present Illness:    This is a very pleasant 43 y.o. male with the past medical history that includes hypertension hyperlipidemia who is seen in consultation for diarrhea and abdominal pain.  The patient states that he had acute onset initially abdominal pain followed by diarrhea and fever that started 3 days ago.  He states he took some Pepto-Bismol due to the pain and diarrhea and his stools were dark later that night.  Symptoms persisted through Sunday with up to 20 loose bowel movements, worse with eating.  He states he had 3 episodes of very small amount of bright red blood in the toilet as well as on the toilet tissue.  He has not had any rectal bleeding since arrival to the hospital.  He states his diarrhea is about the same, but abdominal pain is improved.  The pain is currently mild to moderate and located in the left lower quadrant.  No current fever or chills.  He denies recent travel outside the country or potentially consuming contaminated water.  His family member just brought back some cheese from [place] which he had consumed recently.      Initial labs with WBC 12.1, hemoglobin 17.4, hematocrit 49.7.  CMP sodium 133, potassium 3.5, anion gap 18, lactic acid 3.5.  C. difficile negative.  COVID, RSV, influenza negative.  CT abdomen pelvis with contrast demonstrated diffuse wall thickening of the colon especially transverse, splenic flexure, right colon.    Interval History  1/23/2024: Stable. Patient having much less diarrhea. He states 9-10 times in the last 24 hours and only 4 times overnight. He  denies blood in his stool. Abdominal pain has resolved.     Review of Systems:  Review of Systems   Constitutional:  Positive for malaise/fatigue. Negative for chills and fever.   Respiratory: Negative.     Cardiovascular: Negative.    Gastrointestinal:  Positive for diarrhea. Negative for abdominal pain and blood in stool.   Genitourinary: Negative.    Musculoskeletal: Negative.    Skin: Negative.    Neurological: Negative.    Psychiatric/Behavioral: Negative.               Past Medical History:  Past Medical History:   Diagnosis Date    Hypercholesterolemia     Hypertension      Active Hospital Problems    Diagnosis     Hypomagnesemia [E83.42]     GI bleeding [K92.2]     Sepsis (HCC) [A41.9]     Primary hypertension [I10]     Mixed hyperlipidemia [E78.2]     Hypokalemia [E87.6]     Metabolic acidosis [E87.20]     Hyperglycemia [R73.9]     Colitis [K52.9]          Past Surgical History:  Past Surgical History:   Procedure Laterality Date    ELBOW ARTHROSCOPY Right 2023    SHOULDER ARTHROSCOPY             Hospital Medications:  Current Facility-Administered Medications   Medication Dose Frequency Provider Last Rate Last Admin    omeprazole (PriLOSEC) capsule 20 mg  20 mg BID Marine Florentino D.O.        NS infusion   Continuous Sekou Alonso M.D. 83 mL/hr at 01/22/24 2218 New Bag at 01/22/24 2218    azithromycin (Zithromax) tablet 500 mg  500 mg DAILY Sekou Alonso M.D.   500 mg at 01/23/24 0517    amLODIPine (Norvasc) tablet 5 mg  5 mg DAILY Asem ESTER Caceres M.D.   5 mg at 01/21/24 1631    acetaminophen (Tylenol) tablet 650 mg  650 mg Q6HRS PRN Asem ESTER Caceres M.D.   650 mg at 01/21/24 2026    LR (Bolus) infusion 500 mL  500 mL Once PRN Asem ESTER Caceres M.D.        ondansetron (Zofran) syringe/vial injection 4 mg  4 mg Q4HRS PRN Asemiguel Caceres M.D.        ondansetron (Zofran ODT) dispertab 4 mg  4 mg Q4HRS PRN Asemiguel Caceres M.D.        promethazine (Phenergan) tablet 12.5-25 mg   12.5-25 mg Q4HRS PRN Terrance Caceres M.D.        promethazine (Phenergan) suppository 12.5-25 mg  12.5-25 mg Q4HRS PRN Terrance Caceres M.D.        prochlorperazine (Compazine) injection 5-10 mg  5-10 mg Q4HRS PRN Terrance Caceres M.D.        Pharmacy Consult Request ...Pain Management Review 1 Each  1 Each PHARMACY TO DOSE Terrance Caceres M.D.        oxyCODONE immediate-release (Roxicodone) tablet 2.5 mg  2.5 mg Q3HRS PRN Terrance Caceres M.D.        Or    oxyCODONE immediate-release (Roxicodone) tablet 5 mg  5 mg Q3HRS PRN Terrance Caceres M.D.   5 mg at 01/21/24 1630    Or    HYDROmorphone (Dilaudid) injection 0.5 mg  0.5 mg Q3HRS PRN Terrance Caceres M.D.       Last reviewed on 1/21/2024 12:23 PM by Anuradha Paez       Current Outpatient Medications:  Medications Prior to Admission   Medication Sig Dispense Refill Last Dose    ibuprofen (MOTRIN) 200 MG Tab Take 600 mg by mouth every 6 hours as needed. Indications: Pain   1/21/2024 at 0200    amLODIPine (NORVASC) 5 MG Tab Take 5 mg by mouth every day.   1/19/2024 at AM    atorvastatin (LIPITOR) 20 MG Tab Take 20 mg by mouth every evening.   1/19/2024 at PM    docusate sodium (COLACE) 100 MG Cap Take 100 mg by mouth 2 times a day as needed for Constipation.   1/21/2024 at 0000    polyethylene glycol/lytes (MIRALAX) Pack Take 17 g by mouth every day.   1/19/2024 at AM         Medication Allergies:  No Known Allergies      Family Medical History:  History reviewed. No pertinent family history.      Social History:  Social History     Socioeconomic History    Marital status:      Spouse name: Not on file    Number of children: Not on file    Years of education: Not on file    Highest education level: Not on file   Occupational History    Not on file   Tobacco Use    Smoking status: Never    Smokeless tobacco: Never   Vaping Use    Vaping Use: Never used   Substance and Sexual Activity    Alcohol use: Yes     Comment: Occassional    Drug use: No     "Sexual activity: Not on file   Other Topics Concern    Not on file   Social History Narrative    ** Merged History Encounter **          Social Determinants of Health     Financial Resource Strain: Not on file   Food Insecurity: Not on file   Transportation Needs: Not on file   Physical Activity: Not on file   Stress: Not on file   Social Connections: Not on file   Intimate Partner Violence: Not on file   Housing Stability: Not on file         Vital signs:  Weight/BMI: Body mass index is 31.24 kg/m².  /71   Pulse 61   Temp 37 °C (98.6 °F) (Oral)   Resp 17   Ht 1.676 m (5' 6\")   Wt 87.8 kg (193 lb 9 oz)   SpO2 96%   Vitals:    01/23/24 0046 01/23/24 0451 01/23/24 0521 01/23/24 0738   BP: 121/61 126/71 107/60 111/71   Pulse: 75 73  61   Resp: 18 18  17   Temp: 36.8 °C (98.3 °F) 36.6 °C (97.8 °F)  37 °C (98.6 °F)   TempSrc: Oral Oral  Oral   SpO2: 95% 97%  96%   Weight:  87.8 kg (193 lb 9 oz)     Height:         Oxygen Therapy:  Pulse Oximetry: 96 %, O2 (LPM): 0, O2 Delivery Device: None - Room Air    Intake/Output Summary (Last 24 hours) at 1/23/2024 0848  Last data filed at 1/23/2024 0300  Gross per 24 hour   Intake --   Output 1850 ml   Net -1850 ml           Physical Exam:  Physical Exam  Vitals and nursing note reviewed.   Constitutional:       Appearance: Normal appearance.   HENT:      Head: Normocephalic and atraumatic.      Right Ear: External ear normal.      Left Ear: External ear normal.      Nose: Nose normal. No congestion.      Mouth/Throat:      Mouth: Mucous membranes are moist.      Pharynx: Oropharynx is clear.   Eyes:      General: No scleral icterus.     Extraocular Movements: Extraocular movements intact.      Pupils: Pupils are equal, round, and reactive to light.   Cardiovascular:      Rate and Rhythm: Normal rate and regular rhythm.      Pulses: Normal pulses.      Heart sounds: Normal heart sounds. No murmur heard.  Pulmonary:      Effort: Pulmonary effort is normal.      Breath " sounds: Normal breath sounds.   Abdominal:      General: Abdomen is flat. Bowel sounds are normal.      Palpations: Abdomen is soft.      Tenderness: There is no abdominal tenderness (LLQ).   Musculoskeletal:      Cervical back: Neck supple.      Right lower leg: No edema.      Left lower leg: No edema.   Lymphadenopathy:      Cervical: No cervical adenopathy.   Skin:     General: Skin is warm and dry.      Capillary Refill: Capillary refill takes less than 2 seconds.   Neurological:      General: No focal deficit present.      Mental Status: He is alert and oriented to person, place, and time.   Psychiatric:         Thought Content: Thought content normal.         Judgment: Judgment normal.           Labs:  Recent Labs     01/21/24 1139 01/22/24  0331 01/23/24  0200   SODIUM 133* 135 138   POTASSIUM 3.5* 3.5* 3.8   CHLORIDE 98 102 105   CO2 17* 23 19*   BUN 10 7* 8   CREATININE 0.88 0.66 0.66   MAGNESIUM  --  1.9 2.4   PHOSPHORUS  --   --  3.7   CALCIUM 9.5 8.5 8.7       Recent Labs     01/21/24 1139 01/22/24  0331 01/23/24  0200   ALTSGPT 20 16 28   ASTSGOT 12 11* 23   ALKPHOSPHAT 88 62 64   TBILIRUBIN 1.0 0.7 0.5   GLUCOSE 171* 127* 98       Recent Labs     01/21/24 1139 01/22/24  0331 01/23/24  0200   WBC 12.1* 7.5 6.2   NEUTSPOLYS 76.80*  --   --    LYMPHOCYTES 10.30*  --   --    MONOCYTES 11.10  --   --    EOSINOPHILS 0.80  --   --    BASOPHILS 0.70  --   --    ASTSGOT 12 11* 23   ALTSGPT 20 16 28   ALKPHOSPHAT 88 62 64   TBILIRUBIN 1.0 0.7 0.5       Recent Labs     01/21/24  1139 01/22/24  0331 01/22/24  0656 01/22/24  1653 01/23/24  0200   RBC 5.81 4.62*  --   --  4.71   HEMOGLOBIN 17.4 13.7* 14.1 14.2 14.0   HEMATOCRIT 49.7 40.2* 40.9* 40.7* 40.9*   PLATELETCT 218 168  --   --  202   PROTHROMBTM 15.8*  --   --   --   --    APTT 35.2  --   --   --   --    INR 1.21*  --   --   --   --        Recent Results (from the past 24 hour(s))   HEMOGLOBIN AND HEMATOCRIT    Collection Time: 01/22/24  4:53 PM   Result  Value Ref Range    Hemoglobin 14.2 14.0 - 18.0 g/dL    Hematocrit 40.7 (L) 42.0 - 52.0 %   CBC WITHOUT DIFFERENTIAL    Collection Time: 01/23/24  2:00 AM   Result Value Ref Range    WBC 6.2 4.8 - 10.8 K/uL    RBC 4.71 4.70 - 6.10 M/uL    Hemoglobin 14.0 14.0 - 18.0 g/dL    Hematocrit 40.9 (L) 42.0 - 52.0 %    MCV 86.8 81.4 - 97.8 fL    MCH 29.7 27.0 - 33.0 pg    MCHC 34.2 32.3 - 36.5 g/dL    RDW 37.5 35.9 - 50.0 fL    Platelet Count 202 164 - 446 K/uL    MPV 10.3 9.0 - 12.9 fL   Comp Metabolic Panel    Collection Time: 01/23/24  2:00 AM   Result Value Ref Range    Sodium 138 135 - 145 mmol/L    Potassium 3.8 3.6 - 5.5 mmol/L    Chloride 105 96 - 112 mmol/L    Co2 19 (L) 20 - 33 mmol/L    Anion Gap 14.0 7.0 - 16.0    Glucose 98 65 - 99 mg/dL    Bun 8 8 - 22 mg/dL    Creatinine 0.66 0.50 - 1.40 mg/dL    Calcium 8.7 8.4 - 10.2 mg/dL    Correct Calcium 8.9 8.5 - 10.5 mg/dL    AST(SGOT) 23 12 - 45 U/L    ALT(SGPT) 28 2 - 50 U/L    Alkaline Phosphatase 64 30 - 99 U/L    Total Bilirubin 0.5 0.1 - 1.5 mg/dL    Albumin 3.7 3.2 - 4.9 g/dL    Total Protein 6.5 6.0 - 8.2 g/dL    Globulin 2.8 1.9 - 3.5 g/dL    A-G Ratio 1.3 g/dL   MAGNESIUM    Collection Time: 01/23/24  2:00 AM   Result Value Ref Range    Magnesium 2.4 1.5 - 2.5 mg/dL   PHOSPHORUS    Collection Time: 01/23/24  2:00 AM   Result Value Ref Range    Phosphorus 3.7 2.5 - 4.5 mg/dL   ESTIMATED GFR    Collection Time: 01/23/24  2:00 AM   Result Value Ref Range    GFR (CKD-EPI) 120 >60 mL/min/1.73 m 2         Radiology Review:  CT-ABDOMEN-PELVIS WITH   Final Result      1.  Diffuse wall thickening of the colon especially the transverse colon, splenic flexure, and the right colon      2.  This finding is consistent with colitis. Different diagnosis includes infectious colitis especially pseudomembranous colitis or inflammatory bowel disease      DX-CHEST-PORTABLE (1 VIEW)   Final Result      1.  There is no acute cardiopulmonary process.            GIOVANNI (Data Review):    -Records reviewed and summarized in current documentation  -I personally reviewed and interpreted the laboratory results  -I personally reviewed the radiology images        Medical Decision Making, by Problem:  Active Hospital Problems    Diagnosis     Hypomagnesemia [E83.42]     GI bleeding [K92.2]     Sepsis (HCC) [A41.9]     Primary hypertension [I10]     Mixed hyperlipidemia [E78.2]     Hypokalemia [E87.6]     Metabolic acidosis [E87.20]     Hyperglycemia [R73.9]     Colitis [K52.9]            Assessment/Recommendations:  43-year-old male with acute onset lower abdominal pain, diarrhea.  He did have 3 episodes of a small amount of hematochezia, likely hemorrhoidal bleeding versus less likely from his colitis.  Given no issues with altered bowels prior to this event, suspect infectious colitis such as viral gastroenteritis or bacterial colitis.  Stool cultures negative thus far  Patient is on IV antibiotics targeted with azithromycin given his consumption of cheese.  He is not having any further bleeding.  Additional differential does include IBD or malignancy, however these are considered much less likely at this time.    Assessment:  -Lower abdominal pain  -Diarrhea  -Rectal bleeding-likely hemorrhoidal or related to colitis  -Dark stools-suspect secondary to Pepto-Bismol use  -Abnormal imaging of the GI tract    Plan:  -Await ova and parasites, calprotectin  -Continue antibiotics   -Supportive care.  Imodium as needed  -No indications for colonoscopy as I suspect infectious colitis  -Outpatient follow up in 1-2 months with C    GI Will sign off    ASHELY Raymond      Thank you for inviting me to participate in the care of this patient. Please do not hesitate to call GI consultants with additional questions/concerns or changes in the patient's clinical status at [phone].      Core Quality Measures   Reviewed items:  Labs, Medications and Radiology reports reviewed

## 2024-01-23 NOTE — CARE PLAN
The patient is Stable - Low risk of patient condition declining or worsening    Shift Goals  Clinical Goals: IV fluids, pain mgt  Patient Goals: comfort  Family Goals: claudia    Progress made toward(s) clinical / shift goals:    Problem: Pain - Standard  Goal: Alleviation of pain or a reduction in pain to the patient’s comfort goal  Description: Target End Date:  Prior to discharge or change in level of care    Document on Vitals flowsheet    1.  Document pain using the appropriate pain scale per order or unit policy  2.  Educate and implement non-pharmacologic comfort measures (i.e. relaxation, distraction, massage, cold/heat therapy, etc.)  3.  Pain management medications as ordered  4.  Reassess pain after pain med administration per policy  5.  If opiods administered assess patient's response to pain medication is appropriate per POSS sedation scale  6.  Follow pain management plan developed in collaboration with patient and interdisciplinary team (including palliative care or pain specialists if applicable)  Outcome: Progressing  Note: Pt declines medication for pain, reports pain feels better than yesterday     Problem: Fluid Volume  Goal: Fluid volume balance will be maintained  Description: Target End Date:  Prior to discharge or change in level of care    Document on I/O flowsheet    1.  Monitor intake and output as ordered  2.  Promote oral intake as appropriate  3.  Report inadequate intake or output to physician  4.  Administer IV therapy as ordered  5.  Weights per provider order  6.  Assess for signs and symptoms of bleeding  7.  Monitor for signs of fluid overload (respiratory changes, edema, weight gain, increased abdominal girth)  8.  Monitor of signs for inadequate fluid volume (poor skin turgor, dry mucous membranes)  9.  Instruct patient on adherence to fluid restrictions  Outcome: Progressing  Note: Pt receiving IV fluids     Problem: Urinary - Renal Perfusion  Goal: Ability to achieve and maintain  adequate renal perfusion and functioning will improve  Description: Target End Date:  Prior to discharge or change in level of care    Document on I/O and Assessment flowsheet    1.  Urine output will remain greater than 0.5ml/Kg/HR  2.  Monitor amount and/or characteristics of urine per order/policy. Specific gravity per order/policy  3.  Assess signs and symptoms of renal dysfunction  Outcome: Progressing  Note: Pt with appropriate urine output       Patient is not progressing towards the following goals:

## 2024-01-23 NOTE — PROGRESS NOTES
Hospital Medicine Daily Progress Note    Date of Service  1/23/2024    Chief Complaint  Rex Hidalgo is a 43 y.o. male admitted 1/21/2024 with abdominal pain, weakness, diarrhea    Hospital Course  Patient is a 43-year-old male with history of hypertension hyperlipidemia came in on 1/21 with abdominal pain and weakness and bloody diarrhea.  He been feeling poorly over the last 2 days with worsening cramping and abdominal pain.  Diarrhea was reported bloody.  Patient had recent surgery on elbow approximately 1 month ago.  Patient was started empirically on antibiotics and gastroenterology consulted.  Black stools high suspicion and this was related to the Pepto-Bismol that the patient taken.  H&H remained stable and stool studies pending.    Interval Problem Update  1/23 patient states abdominal pain has resolved and he is feeling much better.  Will complete azithromycin tomorrow.  Will continue with IV hydration and H&H remained stable.  Started on Imodium as needed to help with diarrhea.  I will advance his diet to GI soft.  As long as everything remains stable anticipate discharge home tomorrow.    I have discussed this patient's plan of care and discharge plan at IDT rounds today with Case Management, Nursing, Nursing leadership, and other members of the IDT team.    Consultants/Specialty  GI    Code Status  Full Code    Disposition  The patient is not medically cleared for discharge to home or a post-acute facility.  Anticipate discharge to: home with close outpatient follow-up    I have placed the appropriate orders for post-discharge needs.    Review of Systems  Review of Systems   Constitutional:  Negative for chills and fever.   HENT:  Negative for congestion.    Eyes:  Negative for blurred vision and photophobia.   Respiratory:  Negative for cough and shortness of breath.    Cardiovascular:  Negative for chest pain, claudication and leg swelling.   Gastrointestinal:  Positive for diarrhea  (Less frequent, no blood reported, stools green). Negative for abdominal pain, constipation, heartburn and vomiting.   Genitourinary:  Negative for dysuria and hematuria.   Musculoskeletal:  Negative for joint pain and myalgias.   Skin:  Negative for itching and rash.   Neurological:  Negative for dizziness, sensory change, speech change, weakness and headaches.   Psychiatric/Behavioral:  Negative for depression. The patient is not nervous/anxious and does not have insomnia.         Physical Exam  Temp:  [36.6 °C (97.8 °F)-37 °C (98.6 °F)] 36.9 °C (98.4 °F)  Pulse:  [61-84] 79  Resp:  [17-20] 18  BP: (107-143)/(60-80) 129/74  SpO2:  [93 %-97 %] 96 %    Physical Exam  Vitals and nursing note reviewed.   Constitutional:       General: He is not in acute distress.     Appearance: Normal appearance.   HENT:      Head: Normocephalic and atraumatic.   Eyes:      General: No scleral icterus.     Extraocular Movements: Extraocular movements intact.   Cardiovascular:      Rate and Rhythm: Normal rate and regular rhythm.      Pulses: Normal pulses.      Heart sounds: Normal heart sounds. No murmur heard.  Pulmonary:      Effort: Pulmonary effort is normal. No respiratory distress.      Breath sounds: Normal breath sounds. No wheezing, rhonchi or rales.   Abdominal:      General: Abdomen is flat. Bowel sounds are normal. There is no distension.      Palpations: Abdomen is soft.      Tenderness: There is no rebound.   Musculoskeletal:         General: No swelling or tenderness.      Cervical back: Normal range of motion and neck supple.   Lymphadenopathy:      Cervical: No cervical adenopathy.   Skin:     Coloration: Skin is not jaundiced.      Findings: No erythema.   Neurological:      General: No focal deficit present.      Mental Status: He is alert and oriented to person, place, and time. Mental status is at baseline.      Cranial Nerves: No cranial nerve deficit.   Psychiatric:         Mood and Affect: Mood normal.          Behavior: Behavior normal.         Fluids    Intake/Output Summary (Last 24 hours) at 1/23/2024 1523  Last data filed at 1/23/2024 0300  Gross per 24 hour   Intake --   Output 1350 ml   Net -1350 ml       Laboratory  Recent Labs     01/21/24  1139 01/22/24  0331 01/22/24  0656 01/22/24  1653 01/23/24  0200   WBC 12.1* 7.5  --   --  6.2   RBC 5.81 4.62*  --   --  4.71   HEMOGLOBIN 17.4 13.7* 14.1 14.2 14.0   HEMATOCRIT 49.7 40.2* 40.9* 40.7* 40.9*   MCV 85.5 87.0  --   --  86.8   MCH 29.9 29.7  --   --  29.7   MCHC 35.0 34.1  --   --  34.2   RDW 37.0 38.4  --   --  37.5   PLATELETCT 218 168  --   --  202   MPV 10.0 10.3  --   --  10.3     Recent Labs     01/21/24  1139 01/22/24  0331 01/23/24  0200   SODIUM 133* 135 138   POTASSIUM 3.5* 3.5* 3.8   CHLORIDE 98 102 105   CO2 17* 23 19*   GLUCOSE 171* 127* 98   BUN 10 7* 8   CREATININE 0.88 0.66 0.66   CALCIUM 9.5 8.5 8.7     Recent Labs     01/21/24  1139   APTT 35.2   INR 1.21*               Imaging  CT-ABDOMEN-PELVIS WITH   Final Result      1.  Diffuse wall thickening of the colon especially the transverse colon, splenic flexure, and the right colon      2.  This finding is consistent with colitis. Different diagnosis includes infectious colitis especially pseudomembranous colitis or inflammatory bowel disease      DX-CHEST-PORTABLE (1 VIEW)   Final Result      1.  There is no acute cardiopulmonary process.           Assessment/Plan  * Colitis- (present on admission)  Assessment & Plan  CT imaging concerning for pseudomembranous colitis vs infectious vs inflammatory  Ruled out C. difficile colitis, oral vancomycin has been stopped.  Bowel rest with a modified diet, supportive care with intravenous fluids.  Monitoring and replacing electrolytes as needed.    Infectious colitis, complete azithromycin tomorrow  Appreciate GI recommendations, signed off    GI bleeding  Assessment & Plan  Black tarry stools secondary to Pepto-Bismol likely, stable H&H  Infectious  colitis suspected  Completed azithromycin    Hypomagnesemia  Assessment & Plan  Replace as needed  monitor    Hyperglycemia- (present on admission)  Assessment & Plan  No known history of diabetes   A1c 6.3  Follow-up with PCP    Metabolic acidosis- (present on admission)  Assessment & Plan  Improved  Continue IVF for now, patient still with diarrhea.    Hypokalemia- (present on admission)  Assessment & Plan  Replace as needed  monitor    Mixed hyperlipidemia- (present on admission)  Assessment & Plan  Cardiac diet when patient can take orally   I will hold home statin for now given his current GI symptoms     Primary hypertension- (present on admission)  Assessment & Plan  continue amlodipine with hold parameters     Sepsis (HCC)- (present on admission)  Assessment & Plan  Resolved           VTE prophylaxis:   SCDs/TEDs      I have performed a physical exam and reviewed and updated ROS and Plan today (1/23/2024). In review of yesterday's note (1/22/2024), there are no changes except as documented above.

## 2024-01-23 NOTE — PROGRESS NOTES
Telemetry Strip     Strip printed: 1537  Measurements from am strip were as follows:  Rhythm: SR  HR: 79-98  Measurements: 0.16/ 0.10/ 0.36  Ectopy: none             Normal Values  Rhythm SR  HR Range    Measurements 0.12-0.20 / 0.06-0.10  / 0.30-0.52

## 2024-01-23 NOTE — PROGRESS NOTES
Monitor Summary:    Rhythm:  SR   Rate Range:  68-91  Ectopy: rPVC    Measurements:  0.18/0.10/0.38  (Measured by monitor tech)

## 2024-01-24 VITALS
HEIGHT: 66 IN | HEART RATE: 63 BPM | BODY MASS INDEX: 31.11 KG/M2 | RESPIRATION RATE: 18 BRPM | OXYGEN SATURATION: 98 % | SYSTOLIC BLOOD PRESSURE: 113 MMHG | TEMPERATURE: 98.4 F | WEIGHT: 193.56 LBS | DIASTOLIC BLOOD PRESSURE: 75 MMHG

## 2024-01-24 LAB
ANION GAP SERPL CALC-SCNC: 12 MMOL/L (ref 7–16)
BACTERIA STL CULT: ABNORMAL
BACTERIA STL CULT: ABNORMAL
BUN SERPL-MCNC: 10 MG/DL (ref 8–22)
CALCIUM SERPL-MCNC: 8.7 MG/DL (ref 8.4–10.2)
CALPROTECTIN STL-MCNT: 182 UG/G
CHLORIDE SERPL-SCNC: 107 MMOL/L (ref 96–112)
CO2 SERPL-SCNC: 22 MMOL/L (ref 20–33)
CREAT SERPL-MCNC: 0.62 MG/DL (ref 0.5–1.4)
E COLI SXT1+2 STL IA: ABNORMAL
ERYTHROCYTE [DISTWIDTH] IN BLOOD BY AUTOMATED COUNT: 37.3 FL (ref 35.9–50)
GFR SERPLBLD CREATININE-BSD FMLA CKD-EPI: 123 ML/MIN/1.73 M 2
GLUCOSE SERPL-MCNC: 106 MG/DL (ref 65–99)
HCT VFR BLD AUTO: 39.5 % (ref 42–52)
HGB BLD-MCNC: 13.5 G/DL (ref 14–18)
MCH RBC QN AUTO: 29.7 PG (ref 27–33)
MCHC RBC AUTO-ENTMCNC: 34.2 G/DL (ref 32.3–36.5)
MCV RBC AUTO: 87 FL (ref 81.4–97.8)
PLATELET # BLD AUTO: 240 K/UL (ref 164–446)
PMV BLD AUTO: 10.3 FL (ref 9–12.9)
POTASSIUM SERPL-SCNC: 3.9 MMOL/L (ref 3.6–5.5)
RBC # BLD AUTO: 4.54 M/UL (ref 4.7–6.1)
SIGNIFICANT IND 70042: ABNORMAL
SITE SITE: ABNORMAL
SODIUM SERPL-SCNC: 141 MMOL/L (ref 135–145)
SOURCE SOURCE: ABNORMAL
WBC # BLD AUTO: 6 K/UL (ref 4.8–10.8)

## 2024-01-24 PROCEDURE — A9270 NON-COVERED ITEM OR SERVICE: HCPCS | Performed by: INTERNAL MEDICINE

## 2024-01-24 PROCEDURE — 36415 COLL VENOUS BLD VENIPUNCTURE: CPT

## 2024-01-24 PROCEDURE — 94760 N-INVAS EAR/PLS OXIMETRY 1: CPT

## 2024-01-24 PROCEDURE — 99239 HOSP IP/OBS DSCHRG MGMT >30: CPT | Performed by: INTERNAL MEDICINE

## 2024-01-24 PROCEDURE — 80048 BASIC METABOLIC PNL TOTAL CA: CPT

## 2024-01-24 PROCEDURE — 700102 HCHG RX REV CODE 250 W/ 637 OVERRIDE(OP): Performed by: INTERNAL MEDICINE

## 2024-01-24 PROCEDURE — 85027 COMPLETE CBC AUTOMATED: CPT

## 2024-01-24 RX ADMIN — OMEPRAZOLE 20 MG: 20 CAPSULE, DELAYED RELEASE ORAL at 05:06

## 2024-01-24 RX ADMIN — AZITHROMYCIN DIHYDRATE 500 MG: 250 TABLET ORAL at 05:06

## 2024-01-24 NOTE — DISCHARGE SUMMARY
Discharge Summary    CHIEF COMPLAINT ON ADMISSION  Chief Complaint   Patient presents with    Abdominal Pain     Mid to LLQ since Fri Cramping    Bloody Stools     Black stools since Fri but BM x 3 with blood more recently  Pt is also on pepto-bismal    Dizziness     When standing       Reason for Admission  Bloody Stools, Fatigue     Admission Date  1/21/2024    CODE STATUS  Prior    HPI & HOSPITAL COURSE  Patient is a 43-year-old male with history of hypertension hyperlipidemia came in on 1/21 with abdominal pain and weakness and bloody diarrhea.  He been feeling poorly over the last 2 days with worsening cramping and abdominal pain.  Diarrhea was reported bloody.  Patient had recent surgery on elbow approximately 1 month ago.  Patient was started empirically on antibiotics and gastroenterology consulted.  Black stools high suspicion and this was related to the Pepto-Bismol that the patient taken.  H&H remained stable and stool studies showed no evidence of Cryptosporidium or Giardia, C. difficile.  Patient's H&H remained stable and he has completed course of antibiotics as recommended by gastroenterology, patient appropriate for discharge at this time.  Patient family agreeable to discharge.    Therefore, he is discharged in good and stable condition to home with close outpatient follow-up.    The patient met 2-midnight criteria for an inpatient stay at the time of discharge.    Discharge Date  1/24/2024    FOLLOW UP ITEMS POST DISCHARGE  PCP and GI    DISCHARGE DIAGNOSES  Principal Problem:    Colitis (POA: Yes)  Active Problems:    Sepsis (HCC) (POA: Yes)    Primary hypertension (POA: Yes)    Mixed hyperlipidemia (POA: Yes)    Hypokalemia (POA: Yes)    Metabolic acidosis (POA: Yes)    Hyperglycemia (POA: Yes)    Hypomagnesemia (POA: Unknown)    GI bleeding (POA: Unknown)  Resolved Problems:    * No resolved hospital problems. *      FOLLOW UP  No future appointments.  Mohsen Tamasaby, M.D.  [address]  [facility] [address]  [facility] [address]  [phone]    Follow up        MEDICATIONS ON DISCHARGE     Medication List        CONTINUE taking these medications        Instructions   amLODIPine 5 MG Tabs  Commonly known as: Norvasc   Take 5 mg by mouth every day.  Dose: 5 mg     atorvastatin 20 MG Tabs  Commonly known as: Lipitor   Take 20 mg by mouth every evening.  Dose: 20 mg     docusate sodium 100 MG Caps  Commonly known as: Colace   Take 100 mg by mouth 2 times a day as needed for Constipation.  Dose: 100 mg     ibuprofen 200 MG Tabs  Commonly known as: Motrin   Take 600 mg by mouth every 6 hours as needed. Indications: Pain  Dose: 600 mg     polyethylene glycol/lytes Pack  Commonly known as: Miralax   Take 17 g by mouth every day.  Dose: 17 g              Allergies  No Known Allergies    DIET  No orders of the defined types were placed in this encounter.      ACTIVITY  As tolerated.  Weight bearing as tolerated    CONSULTATIONS  GI-Dr. Cano    PROCEDURES  None    LABORATORY  Lab Results   Component Value Date    SODIUM 141 01/24/2024    POTASSIUM 3.9 01/24/2024    CHLORIDE 107 01/24/2024    CO2 22 01/24/2024    GLUCOSE 106 (H) 01/24/2024    BUN 10 01/24/2024    CREATININE 0.62 01/24/2024        Lab Results   Component Value Date    WBC 6.0 01/24/2024    HEMOGLOBIN 13.5 (L) 01/24/2024    HEMATOCRIT 39.5 (L) 01/24/2024    PLATELETCT 240 01/24/2024        Total time of the discharge process exceeds 36 minutes.

## 2024-01-24 NOTE — CARE PLAN
The patient is Stable - Low risk of patient condition declining or worsening    Shift Goals  Clinical Goals: monitor BMs, IV fluids, ABX  Patient Goals: comfort, go home tomorrow  Family Goals: updates    Progress made toward(s) clinical / shift goals:    Problem: Pain - Standard  Goal: Alleviation of pain or a reduction in pain to the patient’s comfort goal  Description: Target End Date:  Prior to discharge or change in level of care    Document on Vitals flowsheet    1.  Document pain using the appropriate pain scale per order or unit policy  2.  Educate and implement non-pharmacologic comfort measures (i.e. relaxation, distraction, massage, cold/heat therapy, etc.)  3.  Pain management medications as ordered  4.  Reassess pain after pain med administration per policy  5.  If opiods administered assess patient's response to pain medication is appropriate per POSS sedation scale  6.  Follow pain management plan developed in collaboration with patient and interdisciplinary team (including palliative care or pain specialists if applicable)  Outcome: Progressing  Note: Pt reports 1/10 pain, declines needing pain meds     Problem: Fluid Volume  Goal: Fluid volume balance will be maintained  Description: Target End Date:  Prior to discharge or change in level of care    Document on I/O flowsheet    1.  Monitor intake and output as ordered  2.  Promote oral intake as appropriate  3.  Report inadequate intake or output to physician  4.  Administer IV therapy as ordered  5.  Weights per provider order  6.  Assess for signs and symptoms of bleeding  7.  Monitor for signs of fluid overload (respiratory changes, edema, weight gain, increased abdominal girth)  8.  Monitor of signs for inadequate fluid volume (poor skin turgor, dry mucous membranes)  9.  Instruct patient on adherence to fluid restrictions  Outcome: Progressing  Note: Pt receiving IV fluids       Patient is not progressing towards the following goals:

## 2024-01-24 NOTE — PROGRESS NOTES
Telemetry Shift Summary    Rhythm SR  HR Range 74-81  Ectopy none  Measurements 0.18/0.08/0.36      Normal Values  Rhythm SR  HR Range:   Measurements: 0.12-0.20/0.06-0.10/0.30-0.52

## 2024-01-24 NOTE — CARE PLAN
Problem: Pain - Standard  Goal: Alleviation of pain or a reduction in pain to the patient’s comfort goal  Outcome: Progressing     Problem: Knowledge Deficit - Standard  Goal: Patient and family/care givers will demonstrate understanding of plan of care, disease process/condition, diagnostic tests and medications  Outcome: Progressing     Problem: Hemodynamics  Goal: Patient's hemodynamics, fluid balance and neurologic status will be stable or improve  Outcome: Progressing     Problem: Fluid Volume  Goal: Fluid volume balance will be maintained  Outcome: Progressing     Problem: Urinary - Renal Perfusion  Goal: Ability to achieve and maintain adequate renal perfusion and functioning will improve  Outcome: Progressing     Problem: Respiratory  Goal: Patient will achieve/maintain optimum respiratory ventilation and gas exchange  Outcome: Progressing     Problem: Mechanical Ventilation  Goal: Safe management of artificial airway and ventilation  Outcome: Progressing  Goal: Successful weaning off mechanical ventilator, spontaneously maintains adequate gas exchange  Outcome: Progressing  Goal: Patient will be able to express needs and understand communication  Outcome: Progressing     Problem: Physical Regulation  Goal: Diagnostic test results will improve  Outcome: Progressing  Goal: Signs and symptoms of infection will decrease  Outcome: Progressing     Problem: Fall Risk  Goal: Patient will remain free from falls  Outcome: Progressing   The patient is Stable - Low risk of patient condition declining or worsening    Shift Goals  Clinical Goals: stool study  Patient Goals: less frequent BM  Family Goals: help with understanding    Progress made toward(s) clinical / shift goals:  lab D/C stool O&P 2/2 criteria     Patient is not progressing towards the following goals: going home today

## 2024-01-26 LAB
BACTERIA BLD CULT: NORMAL
BACTERIA BLD CULT: NORMAL
SIGNIFICANT IND 70042: NORMAL
SIGNIFICANT IND 70042: NORMAL
SITE SITE: NORMAL
SITE SITE: NORMAL
SOURCE SOURCE: NORMAL
SOURCE SOURCE: NORMAL